# Patient Record
Sex: FEMALE | Race: BLACK OR AFRICAN AMERICAN | NOT HISPANIC OR LATINO | Employment: OTHER | URBAN - METROPOLITAN AREA
[De-identification: names, ages, dates, MRNs, and addresses within clinical notes are randomized per-mention and may not be internally consistent; named-entity substitution may affect disease eponyms.]

---

## 2017-03-10 ENCOUNTER — HOSPITAL ENCOUNTER (INPATIENT)
Facility: HOSPITAL | Age: 82
LOS: 9 days | Discharge: HOSPICE | DRG: 871 | End: 2017-03-20
Attending: EMERGENCY MEDICINE | Admitting: EMERGENCY MEDICINE
Payer: MEDICARE

## 2017-03-10 DIAGNOSIS — Z79.4 TYPE 2 DIABETES MELLITUS WITH DIABETIC NEPHROPATHY, WITH LONG-TERM CURRENT USE OF INSULIN: Chronic | ICD-10-CM

## 2017-03-10 DIAGNOSIS — I10 ESSENTIAL HYPERTENSION: Chronic | ICD-10-CM

## 2017-03-10 DIAGNOSIS — Z99.11 ON MECHANICALLY ASSISTED VENTILATION: ICD-10-CM

## 2017-03-10 DIAGNOSIS — J96.11 CHRONIC RESPIRATORY FAILURE WITH HYPOXIA: ICD-10-CM

## 2017-03-10 DIAGNOSIS — C76.0: ICD-10-CM

## 2017-03-10 DIAGNOSIS — J96.92 RESPIRATORY FAILURE WITH HYPOXIA AND HYPERCAPNIA, UNSPECIFIED CHRONICITY: Primary | ICD-10-CM

## 2017-03-10 DIAGNOSIS — J96.91 RESPIRATORY FAILURE WITH HYPOXIA AND HYPERCAPNIA, UNSPECIFIED CHRONICITY: Primary | ICD-10-CM

## 2017-03-10 DIAGNOSIS — D63.8 ANEMIA OF CHRONIC DISEASE: Chronic | ICD-10-CM

## 2017-03-10 DIAGNOSIS — E11.21 TYPE 2 DIABETES MELLITUS WITH DIABETIC NEPHROPATHY, WITH LONG-TERM CURRENT USE OF INSULIN: Chronic | ICD-10-CM

## 2017-03-10 DIAGNOSIS — N30.00 ACUTE CYSTITIS WITHOUT HEMATURIA: ICD-10-CM

## 2017-03-10 DIAGNOSIS — F03.90 DEMENTIA WITHOUT BEHAVIORAL DISTURBANCE, UNSPECIFIED DEMENTIA TYPE: ICD-10-CM

## 2017-03-10 DIAGNOSIS — I50.9 HEART FAILURE: ICD-10-CM

## 2017-03-10 LAB
ALBUMIN SERPL BCP-MCNC: 3.5 G/DL
ALLENS TEST: ABNORMAL
ALP SERPL-CCNC: 73 U/L
ALT SERPL W/O P-5'-P-CCNC: 15 U/L
ANION GAP SERPL CALC-SCNC: 13 MMOL/L
AST SERPL-CCNC: 28 U/L
BACTERIA #/AREA URNS HPF: ABNORMAL /HPF
BASOPHILS # BLD AUTO: 0.05 K/UL
BASOPHILS NFR BLD: 0.5 %
BILIRUB SERPL-MCNC: 0.4 MG/DL
BILIRUB UR QL STRIP: NEGATIVE
BNP SERPL-MCNC: 301 PG/ML
BUN SERPL-MCNC: 19 MG/DL
CALCIUM SERPL-MCNC: 9.4 MG/DL
CHLORIDE SERPL-SCNC: 106 MMOL/L
CLARITY UR: ABNORMAL
CO2 SERPL-SCNC: 20 MMOL/L
COLOR UR: YELLOW
CREAT SERPL-MCNC: 1.1 MG/DL
DELSYS: ABNORMAL
DIFFERENTIAL METHOD: ABNORMAL
EOSINOPHIL # BLD AUTO: 0.3 K/UL
EOSINOPHIL NFR BLD: 3.4 %
ERYTHROCYTE [DISTWIDTH] IN BLOOD BY AUTOMATED COUNT: 15.7 %
ERYTHROCYTE [SEDIMENTATION RATE] IN BLOOD BY WESTERGREN METHOD: 24 MM/H
EST. GFR  (AFRICAN AMERICAN): 55 ML/MIN/1.73 M^2
EST. GFR  (NON AFRICAN AMERICAN): 48 ML/MIN/1.73 M^2
FIO2: 60
GLUCOSE SERPL-MCNC: 438 MG/DL
GLUCOSE UR QL STRIP: ABNORMAL
HCO3 UR-SCNC: 20.3 MMOL/L (ref 24–28)
HCT VFR BLD AUTO: 32.5 %
HGB BLD-MCNC: 10.3 G/DL
HGB UR QL STRIP: NEGATIVE
HYALINE CASTS #/AREA URNS LPF: 0 /LPF
INR PPP: 1.1
KETONES UR QL STRIP: NEGATIVE
LACTATE SERPL-SCNC: 2.8 MMOL/L
LEUKOCYTE ESTERASE UR QL STRIP: NEGATIVE
LYMPHOCYTES # BLD AUTO: 4.4 K/UL
LYMPHOCYTES NFR BLD: 46.2 %
MAGNESIUM SERPL-MCNC: 2.2 MG/DL
MCH RBC QN AUTO: 25.7 PG
MCHC RBC AUTO-ENTMCNC: 31.7 %
MCV RBC AUTO: 81 FL
MICROSCOPIC COMMENT: ABNORMAL
MIN VOL: 10.1
MODE: ABNORMAL
MONOCYTES # BLD AUTO: 0.7 K/UL
MONOCYTES NFR BLD: 7.2 %
NEUTROPHILS # BLD AUTO: 4 K/UL
NEUTROPHILS NFR BLD: 42.3 %
NITRITE UR QL STRIP: NEGATIVE
PCO2 BLDA: 51.1 MMHG (ref 35–45)
PEEP: 5
PH SMN: 7.21 [PH] (ref 7.35–7.45)
PH UR STRIP: 6 [PH] (ref 5–8)
PHOSPHATE SERPL-MCNC: 6.8 MG/DL
PIP: 26
PLATELET # BLD AUTO: 291 K/UL
PMV BLD AUTO: 10.4 FL
PO2 BLDA: 123 MMHG (ref 80–100)
POC BE: -8 MMOL/L
POC SATURATED O2: 98 % (ref 95–100)
POC TCO2: 22 MMOL/L (ref 23–27)
POTASSIUM SERPL-SCNC: 3.8 MMOL/L
PROT SERPL-MCNC: 8.6 G/DL
PROT UR QL STRIP: ABNORMAL
PROTHROMBIN TIME: 11.1 SEC
RBC # BLD AUTO: 4.01 M/UL
RBC #/AREA URNS HPF: 3 /HPF (ref 0–4)
SAMPLE: ABNORMAL
SITE: ABNORMAL
SODIUM SERPL-SCNC: 139 MMOL/L
SP GR UR STRIP: 1.01 (ref 1–1.03)
SP02: 100
TROPONIN I SERPL DL<=0.01 NG/ML-MCNC: 0.03 NG/ML
URN SPEC COLLECT METH UR: ABNORMAL
UROBILINOGEN UR STRIP-ACNC: NEGATIVE EU/DL
VT: 400
WBC # BLD AUTO: 9.53 K/UL
WBC #/AREA URNS HPF: 4 /HPF (ref 0–5)
YEAST URNS QL MICRO: ABNORMAL

## 2017-03-10 PROCEDURE — 84100 ASSAY OF PHOSPHORUS: CPT

## 2017-03-10 PROCEDURE — 85610 PROTHROMBIN TIME: CPT

## 2017-03-10 PROCEDURE — 99291 CRITICAL CARE FIRST HOUR: CPT | Mod: 25

## 2017-03-10 PROCEDURE — 82570 ASSAY OF URINE CREATININE: CPT

## 2017-03-10 PROCEDURE — 96365 THER/PROPH/DIAG IV INF INIT: CPT

## 2017-03-10 PROCEDURE — 27200966 HC CLOSED SUCTION SYSTEM

## 2017-03-10 PROCEDURE — 83605 ASSAY OF LACTIC ACID: CPT

## 2017-03-10 PROCEDURE — 87186 SC STD MICRODIL/AGAR DIL: CPT

## 2017-03-10 PROCEDURE — 80329 ANALGESICS NON-OPIOID 1 OR 2: CPT

## 2017-03-10 PROCEDURE — 80053 COMPREHEN METABOLIC PANEL: CPT

## 2017-03-10 PROCEDURE — 25000003 PHARM REV CODE 250: Performed by: EMERGENCY MEDICINE

## 2017-03-10 PROCEDURE — 31500 INSERT EMERGENCY AIRWAY: CPT

## 2017-03-10 PROCEDURE — 83735 ASSAY OF MAGNESIUM: CPT

## 2017-03-10 PROCEDURE — 87088 URINE BACTERIA CULTURE: CPT

## 2017-03-10 PROCEDURE — 96375 TX/PRO/DX INJ NEW DRUG ADDON: CPT

## 2017-03-10 PROCEDURE — 85025 COMPLETE CBC W/AUTO DIFF WBC: CPT

## 2017-03-10 PROCEDURE — 36600 WITHDRAWAL OF ARTERIAL BLOOD: CPT

## 2017-03-10 PROCEDURE — 83930 ASSAY OF BLOOD OSMOLALITY: CPT

## 2017-03-10 PROCEDURE — 89220 SPUTUM SPECIMEN COLLECTION: CPT

## 2017-03-10 PROCEDURE — 80307 DRUG TEST PRSMV CHEM ANLYZR: CPT

## 2017-03-10 PROCEDURE — 87077 CULTURE AEROBIC IDENTIFY: CPT

## 2017-03-10 PROCEDURE — 96361 HYDRATE IV INFUSION ADD-ON: CPT

## 2017-03-10 PROCEDURE — 0BH17EZ INSERTION OF ENDOTRACHEAL AIRWAY INTO TRACHEA, VIA NATURAL OR ARTIFICIAL OPENING: ICD-10-PCS | Performed by: EMERGENCY MEDICINE

## 2017-03-10 PROCEDURE — 80320 DRUG SCREEN QUANTALCOHOLS: CPT

## 2017-03-10 PROCEDURE — 63600175 PHARM REV CODE 636 W HCPCS: Performed by: EMERGENCY MEDICINE

## 2017-03-10 PROCEDURE — 51702 INSERT TEMP BLADDER CATH: CPT

## 2017-03-10 PROCEDURE — 83880 ASSAY OF NATRIURETIC PEPTIDE: CPT

## 2017-03-10 PROCEDURE — 87086 URINE CULTURE/COLONY COUNT: CPT

## 2017-03-10 PROCEDURE — 87040 BLOOD CULTURE FOR BACTERIA: CPT

## 2017-03-10 PROCEDURE — 5A1945Z RESPIRATORY VENTILATION, 24-96 CONSECUTIVE HOURS: ICD-10-PCS | Performed by: EMERGENCY MEDICINE

## 2017-03-10 PROCEDURE — 84484 ASSAY OF TROPONIN QUANT: CPT

## 2017-03-10 PROCEDURE — 81000 URINALYSIS NONAUTO W/SCOPE: CPT

## 2017-03-10 RX ORDER — ETOMIDATE 2 MG/ML
20 INJECTION INTRAVENOUS
Status: COMPLETED | OUTPATIENT
Start: 2017-03-10 | End: 2017-03-10

## 2017-03-10 RX ORDER — FENTANYL CITRATE 50 UG/ML
50 INJECTION, SOLUTION INTRAMUSCULAR; INTRAVENOUS
Status: COMPLETED | OUTPATIENT
Start: 2017-03-10 | End: 2017-03-10

## 2017-03-10 RX ORDER — PROPOFOL 10 MG/ML
20 INJECTION, EMULSION INTRAVENOUS
Status: COMPLETED | OUTPATIENT
Start: 2017-03-10 | End: 2017-03-10

## 2017-03-10 RX ORDER — ROCURONIUM BROMIDE 10 MG/ML
100 INJECTION, SOLUTION INTRAVENOUS
Status: COMPLETED | OUTPATIENT
Start: 2017-03-10 | End: 2017-03-10

## 2017-03-10 RX ADMIN — PROPOFOL 20 MCG/KG/MIN: 10 INJECTION, EMULSION INTRAVENOUS at 11:03

## 2017-03-10 RX ADMIN — SODIUM CHLORIDE 1000 ML: 0.9 INJECTION, SOLUTION INTRAVENOUS at 10:03

## 2017-03-10 RX ADMIN — FENTANYL CITRATE 50 MCG: 50 INJECTION, SOLUTION INTRAMUSCULAR; INTRAVENOUS at 10:03

## 2017-03-10 RX ADMIN — ROCURONIUM BROMIDE 100 MG: 10 INJECTION, SOLUTION INTRAVENOUS at 10:03

## 2017-03-10 RX ADMIN — ETOMIDATE 20 MG: 2 INJECTION, SOLUTION INTRAVENOUS at 10:03

## 2017-03-10 NOTE — IP AVS SNAPSHOT
[address] [address] [address] [address]  Phone: [phone]           Patient Discharge Instructions     Our goal is to set you up for success. This packet includes information on your condition, medications, and your home care. It will help you to care for yourself so you don't get sicker and need to go back to the hospital.     Please ask your nurse if you have any questions.        There are many details to remember when preparing to leave the hospital. Here is what you will need to do:    1. Take your medicine. If you are prescribed medications, review your Medication List in the following pages. You may have new medications to  at the pharmacy and others that you'll need to stop taking. Review the instructions for how and when to take your medications. Talk with your doctor or nurses if you are unsure of what to do.     2. Go to your follow-up appointments. Specific follow-up information is listed in the following pages. Your may be contacted by a transition nurse or clinical provider about future appointments. Be sure we have all of the phone numbers to reach you, if needed. Please contact your provider's office if you are unable to make an appointment.     3. Watch for warning signs. Your doctor or nurse will give you detailed warning signs to watch for and when to call for assistance. These instructions may also include educational information about your condition. If you experience any of warning signs to your health, call your doctor.               [facility]  Unless otherwise directed by your provider, please contact [facility], our nurse care line that is available for 24/7 assistance.     [phone] (toll-free)    Registered nurses in the [facility] provide clinical advisement, health education, appointment booking, and other advisory services.                    ** Verify the list of medication(s) below is accurate and up to date.  Carry this with you in case of emergency. If your medications have changed, please notify your healthcare provider.             Medication List      START taking these medications        Additional Info                      amoxicillin-clavulanate 875-125mg 875-125 mg per tablet   Commonly known as:  AUGMENTIN   Quantity:  14 tablet   Refills:  0   Dose:  1 tablet   Indications:  Urinary Tract Infection    Last time this was given:  1 tablet on 3/20/2017 10:00 AM   Instructions:  Take 1 tablet by mouth every 12 (twelve) hours.     Begin Date    AM    Noon    PM    Bedtime         CHANGE how you take these medications        Additional Info                      lisinopril 10 MG tablet   Quantity:  30 tablet   Refills:  3   Dose:  10 mg   What changed:    - medication strength  - how much to take    Last time this was given:  10 mg on 3/18/2017  9:06 AM   Instructions:  Take 1 tablet (10 mg total) by mouth once daily.     Begin Date    AM    Noon    PM    Bedtime         CONTINUE taking these medications        Additional Info                      aspirin 81 MG EC tablet   Commonly known as:  ECOTRIN   Refills:  0   Dose:  81 mg    Last time this was given:  81 mg on 3/20/2017 10:00 AM   Instructions:  Take 81 mg by mouth once daily.     Begin Date    AM    Noon    PM    Bedtime       donepezil 10 MG tablet   Commonly known as:  ARICEPT   Refills:  0   Dose:  10 mg    Last time this was given:  10 mg on 3/19/2017  8:19 PM   Instructions:  Take 10 mg by mouth every evening.     Begin Date    AM    Noon    PM    Bedtime       metformin 500 MG tablet   Commonly known as:  GLUCOPHAGE   Refills:  0   Dose:  500 mg    Instructions:  Take 500 mg by mouth 2 (two) times daily with meals.     Begin Date    AM    Noon    PM    Bedtime       mirtazapine 15 MG tablet   Commonly known as:  REMERON   Refills:  0   Dose:  15 mg    Instructions:  Take 15 mg by mouth every evening.     Begin Date    AM    Noon    PM    Bedtime           "  Where to Get Your Medications      You can get these medications from any pharmacy     Bring a paper prescription for each of these medications     amoxicillin-clavulanate 875-125mg 875-125 mg per tablet    lisinopril 10 MG tablet                  Please bring to all follow up appointments:    1. A copy of your discharge instructions.  2. All medicines you are currently taking in their original bottles.  3. Identification and insurance card.    Please arrive 15 minutes ahead of scheduled appointment time.    Please call 24 hours in advance if you must reschedule your appointment and/or time.        Follow-up Information     Follow up with Bandar Hernández MD.    Specialty:  Geriatric Medicine    Contact information:    [address] [address]  [phone]          Discharge Instructions     Future Orders    Activity as tolerated     Diet general     Questions:    Total calories:      Fat restriction, if any:      Protein restriction, if any:      Na restriction, if any:      Fluid restriction:      Additional restrictions:  Pureed        Primary Diagnosis     Your primary diagnosis was:  Chronic Respiratory Failure      Admission Information     Date & Time Provider Department [facility]    3/10/2017 10:22 PM Josefa Martínez MD [facility] [id number]      Care Providers     Provider Role Specialty Primary office phone    Josefa Martínez MD Attending Provider Hospitalist [phone]    Swati Us, TRISHA Consulting Physician  -- Number not on file      Important Medicare Message          Most Recent Value    Important Message from Medicare Regarding Discharge Appeal Rights  Given to patient/caregiver, Explained to patient/caregiver, Signed/date by patient/caregiver yes 03/20/2017 1647      Your Vitals Were     BP Pulse Temp Resp Height Weight    118/58 (BP Location: Left arm, Patient Position: Lying, BP Method: Automatic) 69 97.9 °F (36.6 °C) (Oral) 19 5' 2" (1.575 m) 51.7 kg (113 lb 15.7 " oz)    SpO2 BMI             98% 20.85 kg/m2         Recent Lab Values     No lab values to display.      Allergies as of 3/20/2017     No Known Allergies      Advance Directives     An advance directive is a document which, in the event you are no longer able to make decisions for yourself, tells your healthcare team what kind of treatment you do or do not want to receive, or who you would like to make those decisions for you.  If you do not currently have an advance directive, [facility] encourages you to create one.  For more information call:  [phone]), [phone] ([phone]),  or log on to [email or web address].        Language Assistance Services     ATTENTION: Language assistance services are available, free of charge. Please call [phone].      ATENCIÓN: Si habla español, tiene a kaur disposición servicios gratuitos de asistencia lingüística. Llame al [phone].     [facility] Ý: N?u b?n nói Ti?ng Vi?t, có các d?ch v? h? tr? ngôn ng? mi?n phí dành cho b?n. G?i s? [phone].        Pneumonmia Discharge Instructions                Diabetes Discharge Instructions                                   [facility] Sign-Up     Activating your [facility] account is as easy as 1-2-3!     1) Visit [email or web address], select Sign Up Now, enter this activation code and your date of birth, then select Next.  [email or web address]  Expires: 5/4/2017  4:50 PM      2) Create a username and password to use when you visit [facility] in the future and select a security question in case you lose your password and select Next.    3) Enter your e-mail address and click Sign Up!    Additional Information  If you have questions, please e-mail [email or web address] or call [phone] to talk to our [facility] staff. Remember, [facility] is NOT to be used for urgent needs. For medical emergencies, dial 911.          [facility] complies with applicable Federal civil rights laws and does not  discriminate on the basis of race, color, national origin, age, disability, or sex.

## 2017-03-11 PROBLEM — N39.0 UTI (URINARY TRACT INFECTION): Status: ACTIVE | Noted: 2017-03-11

## 2017-03-11 PROBLEM — Z99.11 ON MECHANICALLY ASSISTED VENTILATION: Status: ACTIVE | Noted: 2017-03-11

## 2017-03-11 PROBLEM — E11.9 TYPE 2 DIABETES MELLITUS: Chronic | Status: ACTIVE | Noted: 2017-03-11

## 2017-03-11 PROBLEM — J96.92 RESPIRATORY FAILURE WITH HYPOXIA AND HYPERCAPNIA: Status: ACTIVE | Noted: 2017-03-11

## 2017-03-11 PROBLEM — I10 ESSENTIAL HYPERTENSION: Chronic | Status: ACTIVE | Noted: 2017-03-11

## 2017-03-11 PROBLEM — F03.90 DEMENTIA: Chronic | Status: ACTIVE | Noted: 2017-03-11

## 2017-03-11 PROBLEM — J96.01 ACUTE RESPIRATORY FAILURE WITH HYPOXIA: Status: ACTIVE | Noted: 2017-03-11

## 2017-03-11 PROBLEM — D63.8 ANEMIA OF CHRONIC DISEASE: Chronic | Status: ACTIVE | Noted: 2017-03-11

## 2017-03-11 PROBLEM — J96.91 RESPIRATORY FAILURE WITH HYPOXIA AND HYPERCAPNIA: Status: ACTIVE | Noted: 2017-03-11

## 2017-03-11 LAB
ALBUMIN SERPL BCP-MCNC: 3.3 G/DL
ALLENS TEST: ABNORMAL
ALLENS TEST: ABNORMAL
ALP SERPL-CCNC: 59 U/L
ALT SERPL W/O P-5'-P-CCNC: 14 U/L
AMPHET+METHAMPHET UR QL: NEGATIVE
ANION GAP SERPL CALC-SCNC: 15 MMOL/L
APAP SERPL-MCNC: <3 UG/ML
AST SERPL-CCNC: 26 U/L
BARBITURATES UR QL SCN>200 NG/ML: NEGATIVE
BASOPHILS # BLD AUTO: 0.01 K/UL
BASOPHILS NFR BLD: 0.1 %
BENZODIAZ UR QL SCN>200 NG/ML: NEGATIVE
BILIRUB SERPL-MCNC: 0.6 MG/DL
BUN SERPL-MCNC: 17 MG/DL
BZE UR QL SCN: NEGATIVE
CALCIUM SERPL-MCNC: 9.2 MG/DL
CANNABINOIDS UR QL SCN: NEGATIVE
CHLORIDE SERPL-SCNC: 106 MMOL/L
CO2 SERPL-SCNC: 17 MMOL/L
CREAT SERPL-MCNC: 0.9 MG/DL
CREAT UR-MCNC: 37.9 MG/DL
CTP QC/QA: YES
DELSYS: ABNORMAL
DELSYS: ABNORMAL
DIFFERENTIAL METHOD: ABNORMAL
EOSINOPHIL # BLD AUTO: 0 K/UL
EOSINOPHIL NFR BLD: 0.3 %
ERYTHROCYTE [DISTWIDTH] IN BLOOD BY AUTOMATED COUNT: 16 %
ERYTHROCYTE [SEDIMENTATION RATE] IN BLOOD BY WESTERGREN METHOD: 16 MM/H
ERYTHROCYTE [SEDIMENTATION RATE] IN BLOOD BY WESTERGREN METHOD: 24 MM/H
EST. GFR  (AFRICAN AMERICAN): >60 ML/MIN/1.73 M^2
EST. GFR  (NON AFRICAN AMERICAN): >60 ML/MIN/1.73 M^2
ETHANOL SERPL-MCNC: <10 MG/DL
FECAL OCCULT BLOOD, POC: POSITIVE
FIO2: 60
FIO2: 60
GLUCOSE SERPL-MCNC: 315 MG/DL
HCO3 UR-SCNC: 20.4 MMOL/L (ref 24–28)
HCO3 UR-SCNC: 21.8 MMOL/L (ref 24–28)
HCT VFR BLD AUTO: 32.2 %
HGB BLD-MCNC: 10.1 G/DL
LYMPHOCYTES # BLD AUTO: 0.7 K/UL
LYMPHOCYTES NFR BLD: 7 %
MCH RBC QN AUTO: 25.4 PG
MCHC RBC AUTO-ENTMCNC: 31.4 %
MCV RBC AUTO: 81 FL
METHADONE UR QL SCN>300 NG/ML: NEGATIVE
MIN VOL: 10
MIN VOL: 6.9
MODE: ABNORMAL
MODE: ABNORMAL
MONOCYTES # BLD AUTO: 1 K/UL
MONOCYTES NFR BLD: 10.4 %
NEUTROPHILS # BLD AUTO: 7.6 K/UL
NEUTROPHILS NFR BLD: 82.1 %
OPIATES UR QL SCN: NEGATIVE
OSMOLALITY SERPL: 316 MOSM/KG
PCO2 BLDA: 35 MMHG (ref 35–45)
PCO2 BLDA: 37 MMHG (ref 35–45)
PCP UR QL SCN>25 NG/ML: NEGATIVE
PEEP: 5
PEEP: 5
PH SMN: 7.37 [PH] (ref 7.35–7.45)
PH SMN: 7.38 [PH] (ref 7.35–7.45)
PIP: 21
PIP: 24
PLATELET # BLD AUTO: 248 K/UL
PMV BLD AUTO: 9.7 FL
PO2 BLDA: 158 MMHG (ref 80–100)
PO2 BLDA: 203 MMHG (ref 80–100)
POC BE: -3 MMOL/L
POC BE: -4 MMOL/L
POC SATURATED O2: 100 % (ref 95–100)
POC SATURATED O2: 99 % (ref 95–100)
POC TCO2: 21 MMOL/L (ref 23–27)
POC TCO2: 23 MMOL/L (ref 23–27)
POCT GLUCOSE: 158 MG/DL (ref 70–110)
POCT GLUCOSE: 267 MG/DL (ref 70–110)
POCT GLUCOSE: 389 MG/DL (ref 70–110)
POTASSIUM SERPL-SCNC: 3.8 MMOL/L
PROT SERPL-MCNC: 7.9 G/DL
RBC # BLD AUTO: 3.97 M/UL
SALICYLATES SERPL-MCNC: <5 MG/DL
SAMPLE: ABNORMAL
SAMPLE: ABNORMAL
SITE: ABNORMAL
SITE: ABNORMAL
SODIUM SERPL-SCNC: 138 MMOL/L
SP02: 100
SP02: 99
TOXICOLOGY INFORMATION: NORMAL
TROPONIN I SERPL DL<=0.01 NG/ML-MCNC: 0.15 NG/ML
TROPONIN I SERPL DL<=0.01 NG/ML-MCNC: 0.17 NG/ML
VT: 400
VT: 400
WBC # BLD AUTO: 9.27 K/UL

## 2017-03-11 PROCEDURE — 94761 N-INVAS EAR/PLS OXIMETRY MLT: CPT

## 2017-03-11 PROCEDURE — 87070 CULTURE OTHR SPECIMN AEROBIC: CPT

## 2017-03-11 PROCEDURE — 87205 SMEAR GRAM STAIN: CPT

## 2017-03-11 PROCEDURE — 80053 COMPREHEN METABOLIC PANEL: CPT

## 2017-03-11 PROCEDURE — 27200966 HC CLOSED SUCTION SYSTEM

## 2017-03-11 PROCEDURE — 25000003 PHARM REV CODE 250: Performed by: INTERNAL MEDICINE

## 2017-03-11 PROCEDURE — 94003 VENT MGMT INPAT SUBQ DAY: CPT

## 2017-03-11 PROCEDURE — 63600175 PHARM REV CODE 636 W HCPCS: Performed by: EMERGENCY MEDICINE

## 2017-03-11 PROCEDURE — 93306 TTE W/DOPPLER COMPLETE: CPT

## 2017-03-11 PROCEDURE — C9113 INJ PANTOPRAZOLE SODIUM, VIA: HCPCS | Performed by: EMERGENCY MEDICINE

## 2017-03-11 PROCEDURE — 87186 SC STD MICRODIL/AGAR DIL: CPT

## 2017-03-11 PROCEDURE — 63600175 PHARM REV CODE 636 W HCPCS: Performed by: INTERNAL MEDICINE

## 2017-03-11 PROCEDURE — 99900026 HC AIRWAY MAINTENANCE (STAT)

## 2017-03-11 PROCEDURE — 36415 COLL VENOUS BLD VENIPUNCTURE: CPT

## 2017-03-11 PROCEDURE — 93306 TTE W/DOPPLER COMPLETE: CPT | Mod: 26,,, | Performed by: INTERNAL MEDICINE

## 2017-03-11 PROCEDURE — 36600 WITHDRAWAL OF ARTERIAL BLOOD: CPT

## 2017-03-11 PROCEDURE — 87077 CULTURE AEROBIC IDENTIFY: CPT

## 2017-03-11 PROCEDURE — 25000003 PHARM REV CODE 250: Performed by: EMERGENCY MEDICINE

## 2017-03-11 PROCEDURE — 31720 CLEARANCE OF AIRWAYS: CPT

## 2017-03-11 PROCEDURE — 84484 ASSAY OF TROPONIN QUANT: CPT | Mod: 91

## 2017-03-11 PROCEDURE — 85025 COMPLETE CBC W/AUTO DIFF WBC: CPT

## 2017-03-11 PROCEDURE — 20000000 HC ICU ROOM

## 2017-03-11 RX ORDER — DONEPEZIL HYDROCHLORIDE 10 MG/1
10 TABLET, FILM COATED ORAL NIGHTLY
Status: DISCONTINUED | OUTPATIENT
Start: 2017-03-11 | End: 2017-03-20 | Stop reason: HOSPADM

## 2017-03-11 RX ORDER — CIPROFLOXACIN 2 MG/ML
400 INJECTION, SOLUTION INTRAVENOUS
Status: DISCONTINUED | OUTPATIENT
Start: 2017-03-11 | End: 2017-03-12

## 2017-03-11 RX ORDER — SODIUM CHLORIDE 9 MG/ML
INJECTION, SOLUTION INTRAVENOUS CONTINUOUS
Status: ACTIVE | OUTPATIENT
Start: 2017-03-11 | End: 2017-03-12

## 2017-03-11 RX ORDER — ONDANSETRON 2 MG/ML
4 INJECTION INTRAMUSCULAR; INTRAVENOUS EVERY 8 HOURS PRN
Status: DISCONTINUED | OUTPATIENT
Start: 2017-03-11 | End: 2017-03-11

## 2017-03-11 RX ORDER — CHLORHEXIDINE GLUCONATE ORAL RINSE 1.2 MG/ML
15 SOLUTION DENTAL 2 TIMES DAILY
Status: DISCONTINUED | OUTPATIENT
Start: 2017-03-11 | End: 2017-03-20 | Stop reason: HOSPADM

## 2017-03-11 RX ORDER — CIPROFLOXACIN 2 MG/ML
400 INJECTION, SOLUTION INTRAVENOUS
Status: DISCONTINUED | OUTPATIENT
Start: 2017-03-11 | End: 2017-03-11

## 2017-03-11 RX ORDER — SODIUM CHLORIDE 0.9 % (FLUSH) 0.9 %
3 SYRINGE (ML) INJECTION EVERY 8 HOURS
Status: DISCONTINUED | OUTPATIENT
Start: 2017-03-11 | End: 2017-03-11

## 2017-03-11 RX ORDER — INSULIN ASPART 100 [IU]/ML
0-5 INJECTION, SOLUTION INTRAVENOUS; SUBCUTANEOUS
Status: DISCONTINUED | OUTPATIENT
Start: 2017-03-11 | End: 2017-03-20 | Stop reason: HOSPADM

## 2017-03-11 RX ORDER — RAMELTEON 8 MG/1
8 TABLET ORAL NIGHTLY PRN
Status: DISCONTINUED | OUTPATIENT
Start: 2017-03-11 | End: 2017-03-20 | Stop reason: HOSPADM

## 2017-03-11 RX ORDER — SODIUM CHLORIDE 9 MG/ML
INJECTION, SOLUTION INTRAVENOUS CONTINUOUS
Status: DISCONTINUED | OUTPATIENT
Start: 2017-03-11 | End: 2017-03-11

## 2017-03-11 RX ORDER — ASPIRIN 81 MG/1
81 TABLET ORAL DAILY
Status: DISCONTINUED | OUTPATIENT
Start: 2017-03-11 | End: 2017-03-20 | Stop reason: HOSPADM

## 2017-03-11 RX ORDER — PROPOFOL 10 MG/ML
5 INJECTION, EMULSION INTRAVENOUS CONTINUOUS
Status: DISCONTINUED | OUTPATIENT
Start: 2017-03-11 | End: 2017-03-15

## 2017-03-11 RX ORDER — ACETAMINOPHEN 500 MG
500 TABLET ORAL EVERY 6 HOURS PRN
Status: DISCONTINUED | OUTPATIENT
Start: 2017-03-11 | End: 2017-03-20 | Stop reason: HOSPADM

## 2017-03-11 RX ORDER — PANTOPRAZOLE SODIUM 40 MG/10ML
80 INJECTION, POWDER, LYOPHILIZED, FOR SOLUTION INTRAVENOUS
Status: DISCONTINUED | OUTPATIENT
Start: 2017-03-11 | End: 2017-03-11

## 2017-03-11 RX ORDER — PANTOPRAZOLE SODIUM 40 MG/10ML
80 INJECTION, POWDER, LYOPHILIZED, FOR SOLUTION INTRAVENOUS ONCE
Status: COMPLETED | OUTPATIENT
Start: 2017-03-11 | End: 2017-03-11

## 2017-03-11 RX ORDER — ENOXAPARIN SODIUM 100 MG/ML
40 INJECTION SUBCUTANEOUS EVERY 24 HOURS
Status: DISCONTINUED | OUTPATIENT
Start: 2017-03-11 | End: 2017-03-19

## 2017-03-11 RX ORDER — IBUPROFEN 200 MG
16 TABLET ORAL
Status: DISCONTINUED | OUTPATIENT
Start: 2017-03-11 | End: 2017-03-20 | Stop reason: HOSPADM

## 2017-03-11 RX ORDER — LISINOPRIL 20 MG/1
20 TABLET ORAL DAILY
Status: DISCONTINUED | OUTPATIENT
Start: 2017-03-11 | End: 2017-03-11

## 2017-03-11 RX ORDER — HYDRALAZINE HYDROCHLORIDE 20 MG/ML
10 INJECTION INTRAMUSCULAR; INTRAVENOUS EVERY 6 HOURS PRN
Status: DISCONTINUED | OUTPATIENT
Start: 2017-03-11 | End: 2017-03-20 | Stop reason: HOSPADM

## 2017-03-11 RX ORDER — IBUPROFEN 200 MG
24 TABLET ORAL
Status: DISCONTINUED | OUTPATIENT
Start: 2017-03-11 | End: 2017-03-20 | Stop reason: HOSPADM

## 2017-03-11 RX ORDER — GLUCAGON 1 MG
1 KIT INJECTION
Status: DISCONTINUED | OUTPATIENT
Start: 2017-03-11 | End: 2017-03-20 | Stop reason: HOSPADM

## 2017-03-11 RX ORDER — ONDANSETRON 2 MG/ML
8 INJECTION INTRAMUSCULAR; INTRAVENOUS EVERY 8 HOURS PRN
Status: DISCONTINUED | OUTPATIENT
Start: 2017-03-11 | End: 2017-03-20 | Stop reason: HOSPADM

## 2017-03-11 RX ADMIN — ENOXAPARIN SODIUM 40 MG: 100 INJECTION SUBCUTANEOUS at 12:03

## 2017-03-11 RX ADMIN — ASPIRIN 81 MG: 81 TABLET, COATED ORAL at 10:03

## 2017-03-11 RX ADMIN — CHLORHEXIDINE GLUCONATE 15 ML: 1.2 RINSE ORAL at 08:03

## 2017-03-11 RX ADMIN — PIPERACILLIN SODIUM AND TAZOBACTAM SODIUM 4.5 G: 4; .5 INJECTION, POWDER, LYOPHILIZED, FOR SOLUTION INTRAVENOUS at 01:03

## 2017-03-11 RX ADMIN — CHLORHEXIDINE GLUCONATE 15 ML: 1.2 RINSE ORAL at 09:03

## 2017-03-11 RX ADMIN — SODIUM CHLORIDE: 0.9 INJECTION, SOLUTION INTRAVENOUS at 09:03

## 2017-03-11 RX ADMIN — DEXTROSE 8 MG/HR: 50 INJECTION, SOLUTION INTRAVENOUS at 02:03

## 2017-03-11 RX ADMIN — SODIUM CHLORIDE: 0.9 INJECTION, SOLUTION INTRAVENOUS at 02:03

## 2017-03-11 RX ADMIN — FENTANYL CITRATE 25 MCG/HR: 50 INJECTION, SOLUTION INTRAMUSCULAR; INTRAVENOUS at 03:03

## 2017-03-11 RX ADMIN — INSULIN ASPART 3 UNITS: 100 INJECTION, SOLUTION INTRAVENOUS; SUBCUTANEOUS at 12:03

## 2017-03-11 RX ADMIN — PANTOPRAZOLE SODIUM 80 MG: 40 INJECTION, POWDER, FOR SOLUTION INTRAVENOUS at 02:03

## 2017-03-11 RX ADMIN — PROPOFOL 10 MCG/KG/MIN: 10 INJECTION, EMULSION INTRAVENOUS at 05:03

## 2017-03-11 RX ADMIN — DEXTROSE 8 MG/HR: 50 INJECTION, SOLUTION INTRAVENOUS at 11:03

## 2017-03-11 RX ADMIN — CIPROFLOXACIN 400 MG: 2 INJECTION, SOLUTION INTRAVENOUS at 03:03

## 2017-03-11 RX ADMIN — PIPERACILLIN AND TAZOBACTAM 4.5 G: 4; .5 INJECTION, POWDER, FOR SOLUTION INTRAVENOUS at 09:03

## 2017-03-11 RX ADMIN — DEXTROSE 8 MG/HR: 50 INJECTION, SOLUTION INTRAVENOUS at 05:03

## 2017-03-11 RX ADMIN — SODIUM CHLORIDE: 0.9 INJECTION, SOLUTION INTRAVENOUS at 11:03

## 2017-03-11 RX ADMIN — SODIUM CHLORIDE: 0.9 INJECTION, SOLUTION INTRAVENOUS at 03:03

## 2017-03-11 RX ADMIN — VANCOMYCIN HYDROCHLORIDE 750 MG: 750 INJECTION, POWDER, LYOPHILIZED, FOR SOLUTION INTRAVENOUS at 02:03

## 2017-03-11 RX ADMIN — PROPOFOL 50 MCG/KG/MIN: 10 INJECTION, EMULSION INTRAVENOUS at 02:03

## 2017-03-11 RX ADMIN — PIPERACILLIN AND TAZOBACTAM 4.5 G: 4; .5 INJECTION, POWDER, FOR SOLUTION INTRAVENOUS at 06:03

## 2017-03-11 RX ADMIN — DEXTROSE 8 MG/HR: 50 INJECTION, SOLUTION INTRAVENOUS at 09:03

## 2017-03-11 RX ADMIN — INSULIN ASPART 5 UNITS: 100 INJECTION, SOLUTION INTRAVENOUS; SUBCUTANEOUS at 05:03

## 2017-03-11 RX ADMIN — DONEPEZIL HYDROCHLORIDE 10 MG: 10 TABLET, FILM COATED ORAL at 09:03

## 2017-03-11 RX ADMIN — DEXTROSE 8 MG/HR: 50 INJECTION, SOLUTION INTRAVENOUS at 04:03

## 2017-03-11 NOTE — ASSESSMENT & PLAN NOTE
The etiology of her respiratory failure is unclear at this time.  I have reviewed the chest X-ray and it reveals no focal infiltrates or pleural effusions.  She was intubated for airway protection and for hypoxia as she was with agonal respirations.  A post-intubation ABG was obtained to and was revealing for 7.207  51.1  123  20.3 on a ventilator setting of AC 14  400  +5  60%.  Will consult Pulmonary ventilator management.

## 2017-03-11 NOTE — ED NOTES
Patient transferred to ICU ROOM 271 via 100% ambu bag and placed back on ventilator upon arrival. All alarms on, set and functioning. Will continue to monitor.

## 2017-03-11 NOTE — PLAN OF CARE
Problem: Patient Care Overview  Goal: Plan of Care Review  Outcome: Ongoing (interventions implemented as appropriate)  Pt admitted to ICU last night for respiratory failure with hypoxia. Pt arrived to ICU intubated, copious bloody secretions around mouth and in ETT and filter. OGT to LIWsuction with dark brown/bloody output, ~150 ml. Protonix gtt started. Propofol and fentanyl for sedation. [place] with adequate output. IVF's @ 100 ml/hr. Wakes up and tracks but does not follow commands, question of whether pt speaks english or not. Remains free from fall, injury, or breakdown throughout shift.

## 2017-03-11 NOTE — ED PROVIDER NOTES
Encounter Date: 3/10/2017    SCRIBE #1 NOTE: I, Xavier Brannon, am scribing for, and in the presence of,  Karlos Pandey MD. I have scribed the following portions of the note - Other sections scribed: HPI, ROS.       History     Chief Complaint   Patient presents with    Shortness of Breath     FROM [facility], PER EMS PT UNRESPONSIVE, SHALLOW BREATHING, 72% RA. ALL OTHER VSS.     Review of patient's allergies indicates:  No Known Allergies  HPI Comments: CC: Unresponsiveness    HPI: This 82 year old female has a past medical history of dementia, diabetes mellitus, and hypertension presents to the ED via EMS for evaluation of unresponsiveness. Per EMS, pt was picked up from [facility]. EMS reports pt was found unresponsive at 9:15 pm today. EMS also reports pt had shallow breathing. History limited due to acuity of symptoms.           The history is provided by the EMS personnel. No  was used.     Past Medical History:   Diagnosis Date    Cancer     Dementia     Diabetes mellitus     Hypertension     Thyroid disease      History reviewed. No pertinent surgical history.  History reviewed. No pertinent family history.  Social History   Substance Use Topics    Smoking status: Never Smoker    Smokeless tobacco: None    Alcohol use No     Review of Systems   Unable to perform ROS: Acuity of condition       Physical Exam   Initial Vitals   BP Pulse Resp Temp SpO2   -- -- -- -- --            Physical Exam    Nursing note and vitals reviewed.  HENT:   Head: Normocephalic and atraumatic.   Eyes: Conjunctivae are normal.   Neck: Normal range of motion. Neck supple. No JVD present.   Cardiovascular:   Tachycardic, regular rhythm   Pulmonary/Chest: No stridor.   Agonal - BS diminished bilaterally.   Abdominal: Soft.   Musculoskeletal: Normal range of motion.   Neurological:   GCS 3         ED Course   Critical Care  Date/Time: 3/11/2017 12:22 AM  Performed by: AMANDA  RICH SCHWARZ  Authorized by: RICH PATEL   Direct patient critical care time: 20 minutes  Additional history critical care time: 10 minutes  Ordering / reviewing critical care time: 10 minutes  Documentation critical care time: 10 minutes  Consulting other physicians critical care time: 5 minutes  Total critical care time (exclusive of procedural time) : 55 minutes  Critical care time was exclusive of teaching time and separately billable procedures and treating other patients.  Critical care was necessary to treat or prevent imminent or life-threatening deterioration of the following conditions: respiratory failure and CNS failure or compromise.  Critical care was time spent personally by me on the following activities: development of treatment plan with patient or surrogate, evaluation of patient's response to treatment, examination of patient, obtaining history from patient or surrogate, ordering and performing treatments and interventions, ordering and review of laboratory studies, ordering and review of radiographic studies, pulse oximetry, re-evaluation of patient's condition and review of old charts.    Intubation  Date/Time: 3/11/2017 1:23 AM  Performed by: RICH PATEL  Authorized by: RICH PATEL   Consent Done: Emergent Situation  Indications: respiratory failure,  airway protection,  hypoxemia and  hypercapnia  Intubation method: direct  Patient status: paralyzed (RSI)  Preoxygenation: BVM  Sedatives: etomidate  Paralytic: rocuronium  Laryngoscope size: Mac 4  Tube size: 7.5 mm  Tube type: cuffed  Number of attempts: 1  Cricoid pressure: yes  Cords visualized: yes  Post-procedure assessment: CO2 detector and chest rise  Breath sounds: diminished bilaterally  Cuff inflated: yes  ETT to lip: 24 cm  Tube secured with: ETT hugo  Chest x-ray interpreted by me and radiologist.  Chest x-ray findings: endotracheal tube in appropriate position  Patient tolerance: Patient tolerated the procedure  well with no immediate complications  Complications: No  Comments: Although the intubation was atraumatic, there was a scant amount of blood visualized just above the cords w/o an obviously identifiable source.        Labs Reviewed   CBC W/ AUTO DIFFERENTIAL - Abnormal; Notable for the following:        Result Value    Hemoglobin 10.3 (*)     Hematocrit 32.5 (*)     MCV 81 (*)     MCH 25.7 (*)     MCHC 31.7 (*)     RDW 15.7 (*)     All other components within normal limits   COMPREHENSIVE METABOLIC PANEL - Abnormal; Notable for the following:     CO2 20 (*)     Glucose 438 (*)     Total Protein 8.6 (*)     eGFR if  55 (*)     eGFR if non  48 (*)     All other components within normal limits   B-TYPE NATRIURETIC PEPTIDE - Abnormal; Notable for the following:      (*)     All other components within normal limits   PHOSPHORUS - Abnormal; Notable for the following:     Phosphorus 6.8 (*)     All other components within normal limits   LACTIC ACID, PLASMA - Abnormal; Notable for the following:     Lactate (Lactic Acid) 2.8 (*)     All other components within normal limits   URINALYSIS - Abnormal; Notable for the following:     Appearance, UA Hazy (*)     Protein, UA 1+ (*)     Glucose, UA 3+ (*)     All other components within normal limits   URINALYSIS MICROSCOPIC - Abnormal; Notable for the following:     Bacteria, UA Many (*)     All other components within normal limits   ISTAT PROCEDURE - Abnormal; Notable for the following:     POC PH 7.207 (*)     POC PCO2 51.1 (*)     POC PO2 123 (*)     POC HCO3 20.3 (*)     POC TCO2 22 (*)     All other components within normal limits   ISTAT PROCEDURE - Abnormal; Notable for the following:     POC PO2 158 (*)     POC HCO3 20.4 (*)     POC TCO2 21 (*)     All other components within normal limits   PROTIME-INR   TROPONIN I   MAGNESIUM     EKG Readings: (Independently Interpreted)   Initial Reading: No STEMI.   Sinus tachycardia, rate 139,  left axis deviation, + LVH, normal intervals, nonspecific ST abnormalities.          Medical Decision Making:   History:   I obtained history from: EMS provider and someone other than patient.  Old Medical Records: I decided to obtain old medical records.  Old Records Summarized: records from clinic visits and other records.  Independently Interpreted Test(s):   I have ordered and independently interpreted X-rays - see prior notes.  I have ordered and independently interpreted EKG Reading(s) - see prior notes  Clinical Tests:   Lab Tests: Ordered and Reviewed  Radiological Study: Ordered and Reviewed  Medical Tests: Ordered and Reviewed    Additional MDM:   Comments:   Emergent evaluation of an 82 year old female has a past medical history of dementia, diabetes mellitus, and hypertension presents to the ED via EMS after having been found unresponsive.  Patient intubated secondary to failure to oxygenate as well as to protect airway.  She has been pancultured prior to starting broad spectrum antibiotics.  She will be admitted to te ICU in critical condiition.      Code Status - FULL code. .          Scribe Attestation:   Scribe #1: I performed the above scribed service and the documentation accurately describes the services I performed. I attest to the accuracy of the note.    Attending Attestation:           Physician Attestation for Scribe:  Physician Attestation Statement for Scribe #1: I, Karlos Pandey MD, reviewed documentation, as scribed by Xavier Brannon in my presence, and it is both accurate and complete.                 ED Course     Clinical Impression:   The primary encounter diagnosis was Respiratory failure with hypoxia and hypercapnia, unspecified chronicity. Diagnoses of Heart failure, Chronic respiratory failure with hypoxia, On mechanically assisted ventilation, Acute cystitis without hematuria, Essential hypertension, Type 2 diabetes mellitus with diabetic nephropathy, with long-term current  use of insulin, Dementia without behavioral disturbance, unspecified dementia type, Anemia of chronic disease, and Neck malignant neoplasm were also pertinent to this visit.    Disposition:   Disposition: Admitted  Condition: Critical       Karlos Pandey MD  04/10/17 0552

## 2017-03-11 NOTE — CONSULTS
Consult Note  Critical Care    Consult Requested By: Dr Starks  Reason for Consult: Vent     SUBJECTIVE:     History of Present Illness:  Patient is a 82 y.o. female presents with a PMHx of HTN, DM and dementia . She was found at NH unresponsive . Brought to ER - agonal resp - thus intubated . Currently sedated - Nurse reports moves all ext equally     Review of patient's allergies indicates:  No Known Allergies    Past Medical History:   Diagnosis Date    Cancer     Dementia     Diabetes mellitus     Hypertension     Thyroid disease      History reviewed. No pertinent surgical history.  History reviewed. No pertinent family history.  Social History   Substance Use Topics    Smoking status: Never Smoker    Smokeless tobacco: None    Alcohol use No        Review of Systems:  Review of systems not obtained due to patient factors Intubated .    Prescriptions Prior to Admission   Medication Sig Dispense Refill Last Dose    aspirin (ECOTRIN) 81 MG EC tablet Take 81 mg by mouth once daily.       donepezil (ARICEPT) 10 MG tablet Take 10 mg by mouth every evening.       lisinopril (PRINIVIL,ZESTRIL) 20 MG tablet Take 20 mg by mouth once daily.       metformin (GLUCOPHAGE) 500 MG tablet Take 500 mg by mouth 2 (two) times daily with meals.       mirtazapine (REMERON) 15 MG tablet Take 15 mg by mouth every evening.          Current Facility-Administered Medications   Medication    0.9%  NaCl infusion    acetaminophen tablet 500 mg    aspirin EC tablet 81 mg    chlorhexidine 0.12 % solution 15 mL    ciprofloxacin (CIPRO)400mg/200ml D5W IVPB 400 mg    dextrose 50% injection 12.5 g    dextrose 50% injection 25 g    donepezil tablet 10 mg    enoxaparin injection 40 mg    fentaNYL (SUBLIMAZE) 2,500 mcg in dextrose 5 % 250 mL infusion    glucagon (human recombinant) injection 1 mg    glucose chewable tablet 16 g    glucose chewable tablet 24 g    hydrALAZINE injection 10 mg    insulin aspart pen 0-5  Units    lisinopril tablet 20 mg    ondansetron injection 8 mg    pantoprozole 40 mg in dextrose 5 % 100 mL infusion (ready to mix system)    piperacillin-tazobactam 4.5 g in dextrose 5 % 100 mL IVPB (ready to mix system)    pneumoc 13-emma conj-dip cr(PF) 0.5 mL 0.5 mL    promethazine (PHENERGAN) 12.5 mg in dextrose 5 % 50 mL IVPB    propofol (DIPRIVAN) 10 mg/mL infusion    ramelteon tablet 8 mg    vancomycin 750 mg in dextrose 5 % 250 mL IVPB (ready to mix system)         OBJECTIVE:     Vital Signs (Most Recent)  Temp: 98 °F (36.7 °C) (03/11/17 0730)  Pulse: 72 (03/11/17 0905)  Resp: 15 (03/11/17 0905)  BP: (!) 82/52 (03/11/17 0905)  SpO2: 100 % (03/11/17 0905)    Vital Signs Range (Last 24H):  Temp:  [97.3 °F (36.3 °C)-98.8 °F (37.1 °C)]   Pulse:  []   Resp:  [7-33]   BP: ()/()   SpO2:  [93 %-100 %]   Ventilator Data (Last 24H):     Vent Mode: PRVC  Oxygen Concentration (%):  [40-60] 40  Resp Rate Total:  [16 br/min-24 br/min] 16 br/min  Vt Set:  [400 mL] 400 mL  PEEP/CPAP:  [5 cmH20] 5 cmH20  Mean Airway Pressure:  [7.8 loN97-62.2 cmH20] 7.8 cmH20    Hemodynamic Parameters (Last 24H):       Physical Exam:  General: cachectic  Head: normocephalic, atraumatic  Eyes:  conjunctivae/corneas clear. PERRL.  Nose: Nares normal. Septum midline. Mucosa normal. No drainage or sinus tenderness., no discharge  Throat: limited visualization due to endotracheal tube  Neck: supple, symmetrical, trachea midline, no JVD and thyroid not enlarged, symmetric, no tenderness/mass/nodules  Lungs:  clear to auscultation bilaterally and normal respiratory effort  Heart: S1, S2 normal  Abdomen: soft, non-tender non-distented; bowel sounds normal; no masses,  no organomegaly  Extremities: no cyanosis or edema, or clubbing  Skin: Skin color, texture, turgor normal. No rashes or lesions  Neurologic: Sedated        Laboratory:  CBC:   Recent Labs  Lab 03/11/17  0255   WBC 9.27   RBC 3.97*   HGB 10.1*   HCT 32.2*       MCV 81*   MCH 25.4*   MCHC 31.4*     BMP:   Recent Labs  Lab 03/10/17  2235 03/11/17  0255   * 315*    138   K 3.8 3.8    106   CO2 20* 17*   BUN 19 17   CREATININE 1.1 0.9   CALCIUM 9.4 9.2   MG 2.2  --      CMP:   Recent Labs  Lab 03/11/17  0255   *   CALCIUM 9.2   ALBUMIN 3.3*   PROT 7.9      K 3.8   CO2 17*      BUN 17   CREATININE 0.9   ALKPHOS 59   ALT 14   AST 26   BILITOT 0.6       Vent Mode: PRVC  Oxygen Concentration (%):  [40-60] 40  Resp Rate Total:  [16 br/min-24 br/min] 16 br/min  Vt Set:  [400 mL] 400 mL  PEEP/CPAP:  [5 cmH20] 5 cmH20  Mean Airway Pressure:  [7.8 veL28-92.2 cmH20] 7.8 cmH20       ABGs:   Recent Labs  Lab 03/11/17  0329   PH 7.378   PCO2 37.0   PO2 203*   HCO3 21.8*   POCSATURATED 100   BE -3       Chest X-Ray: I personally reviewed the films and findings are:    Some perihilar infiltrates     ASSESSMENT/PLAN:     1) Found down - etiology ? - Nurse reports that she moves all ext spontaneously   2) Resp - was intubated for airway protection - her CXR does seem to have some perihilar infiltrates - DDX pulm edema vs aspiration - vent as above . Will wean as tolerated - on Zosyn   3) Poss pulm edema - will check an Echo   4) AG MA - lactic acid 2.8

## 2017-03-11 NOTE — H&P
[facility] Medicine  History & Physical    Patient Name: Teodoro Whitehead  MRN: [id number]  Admission Date: 3/10/2017  Attending Physician: Valentina Ferris MD   Primary Care Provider: Primary Doctor No         Patient information was obtained from ER records.     Subjective:     Principal Problem:Acute respiratory failure with hypoxia    Chief Complaint: Unresponsiveness intoday.    HPI: Mrs. Teodoro Whitehead is a 82 y.o. female [facility] with essential hypertension, type 2 diabetes mellitus (HbA1c unknown), anemia of chronic disease, and dementia who presents to [facility] ED with complaints of unresponsiveness tonight.  She was found in this state at around 9:15 PM tonight.  She was reportedly shallow breathing.  EMS was activated and noted that her oxygen saturation on ambient air was 70% and that her heart rate was in the 140s.  Further history is limited at this time.    Chart Review:  Patient has not had any recent hospitalizations or outpatient clinic visits within the system.    Past Medical History:   Diagnosis Date    Cancer     Dementia     Diabetes mellitus     Hypertension     Thyroid disease        History reviewed. No pertinent surgical history.    Review of patient's allergies indicates:  No Known Allergies    No current facility-administered medications on file prior to encounter.      Current Outpatient Prescriptions on File Prior to Encounter   Medication Sig    aspirin (ECOTRIN) 81 MG EC tablet Take 81 mg by mouth once daily.    donepezil (ARICEPT) 10 MG tablet Take 10 mg by mouth every evening.    lisinopril (PRINIVIL,ZESTRIL) 20 MG tablet Take 20 mg by mouth once daily.    metformin (GLUCOPHAGE) 500 MG tablet Take 500 mg by mouth 2 (two) times daily with meals.    mirtazapine (REMERON) 15 MG tablet Take 15 mg by mouth every evening.     Family History     None        Social History Main Topics    Smoking status: Never Smoker     Smokeless tobacco: Not on file    Alcohol use No    Drug use: No    Sexual activity: Not on file     Review of Systems   Unable to perform ROS: Intubated     Objective:     Vital Signs (Most Recent):  Temp: 98.8 °F (37.1 °C) (03/11/17 0330)  Pulse: 78 (03/11/17 0331)  Resp: (!) 26 (03/11/17 0331)  BP: 97/69 (03/11/17 0338)  SpO2: 100 % (03/11/17 0331) Vital Signs (24h Range):  Temp:  [97.3 °F (36.3 °C)-98.8 °F (37.1 °C)] 98.8 °F (37.1 °C)  Pulse:  [] 78  Resp:  [7-32] 26  SpO2:  [93 %-100 %] 100 %  BP: ()/() 97/69     Weight: 49.5 kg (109 lb 2 oz)  Body mass index is 19.96 kg/(m^2).    Physical Exam   Constitutional: She appears well-developed and well-nourished. No distress.   HENT:   Head: Normocephalic and atraumatic.   Right Ear: External ear normal.   Left Ear: External ear normal.   Nose: Nose normal.   OGT and ETT in place with some blood present in mouth   Eyes: Right eye exhibits no discharge. Left eye exhibits no discharge.   Cardiovascular: Normal rate, regular rhythm, normal heart sounds and intact distal pulses.  Exam reveals no friction rub.    No murmur heard.  Pulmonary/Chest:   Mechanically ventilated with coarse rhonchi.  No murmurs or rubs.   Abdominal: Soft. Bowel sounds are normal. She exhibits no distension. There is no tenderness. There is no rebound and no guarding.   Musculoskeletal: Normal range of motion. She exhibits no edema.   Skin: Skin is warm and dry. She is not diaphoretic. There is erythema.   Psychiatric: She has a normal mood and affect. Her behavior is normal. Judgment and thought content normal.   Nursing note and vitals reviewed.       Significant Labs: All pertinent labs within the past 24 hours have been reviewed.    Significant Imaging: I have reviewed and interpreted all pertinent imaging results/findings within the past 24 hours.    Assessment/Plan:     * Acute respiratory failure with hypoxia  The etiology of her respiratory failure is unclear at  this time.  I have reviewed the chest X-ray and it reveals no focal infiltrates or pleural effusions.  She was intubated for airway protection and for hypoxia as she was with agonal respirations.  A post-intubation ABG was obtained to and was revealing for 7.207  51.1  123  20.3 on a ventilator setting of AC 14  400  +5  60%.  Will consult Pulmonary ventilator management.    On mechanically assisted ventilation  As addressed above.    UTI (urinary tract infection)  Her urinalysis is significant for a specific gravity of 1.010, 1+ protein, +3 ketones, and many bacteria.  Urine and blood cultures are pending.  Will start empiric antibiotic therapy.    Essential hypertension  Patient's blood pressure is better-controlled; will continue home regimen of lisinopril, and provide as-needed hydralazine.    Type 2 diabetes mellitus  She is on a home regimen of metformin but will start basal insulin therapy along with insulin sliding scale.    Dementia  Will continue her home regimen of donepezil.      Anemia of chronic disease  The patient's H/H is stable and consistent with previous laboratory measurements, and the patient exhibits no signs or symptoms of acute bleeding; there is no indication for transfusion.  Will continue to monitor.    VTE Risk Mitigation         Ordered     enoxaparin injection 40 mg  Daily     Route:  Subcutaneous        03/11/17 0345     Medium Risk of VTE  Once      03/11/17 0345            Total time spent on case: 60 minutes.        Dorcas Dias M.D.  Staff Nocturnist  Department of Hospital Medicine  [facility]  Pager: [phone]

## 2017-03-11 NOTE — ASSESSMENT & PLAN NOTE
She is on a home regimen of metformin but will start basal insulin therapy along with insulin sliding scale.

## 2017-03-11 NOTE — SUBJECTIVE & OBJECTIVE
Past Medical History:   Diagnosis Date    Cancer     Dementia     Diabetes mellitus     Hypertension     Thyroid disease        History reviewed. No pertinent surgical history.    Review of patient's allergies indicates:  No Known Allergies    No current facility-administered medications on file prior to encounter.      Current Outpatient Prescriptions on File Prior to Encounter   Medication Sig    aspirin (ECOTRIN) 81 MG EC tablet Take 81 mg by mouth once daily.    donepezil (ARICEPT) 10 MG tablet Take 10 mg by mouth every evening.    lisinopril (PRINIVIL,ZESTRIL) 20 MG tablet Take 20 mg by mouth once daily.    metformin (GLUCOPHAGE) 500 MG tablet Take 500 mg by mouth 2 (two) times daily with meals.    mirtazapine (REMERON) 15 MG tablet Take 15 mg by mouth every evening.     Family History     None        Social History Main Topics    Smoking status: Never Smoker    Smokeless tobacco: Not on file    Alcohol use No    Drug use: No    Sexual activity: Not on file     Review of Systems   Unable to perform ROS: Intubated     Objective:     Vital Signs (Most Recent):  Temp: 98.8 °F (37.1 °C) (03/11/17 0330)  Pulse: 78 (03/11/17 0331)  Resp: (!) 26 (03/11/17 0331)  BP: 97/69 (03/11/17 0338)  SpO2: 100 % (03/11/17 0331) Vital Signs (24h Range):  Temp:  [97.3 °F (36.3 °C)-98.8 °F (37.1 °C)] 98.8 °F (37.1 °C)  Pulse:  [] 78  Resp:  [7-32] 26  SpO2:  [93 %-100 %] 100 %  BP: ()/() 97/69     Weight: 49.5 kg (109 lb 2 oz)  Body mass index is 19.96 kg/(m^2).    Physical Exam   Constitutional: She appears well-developed and well-nourished. No distress.   HENT:   Head: Normocephalic and atraumatic.   Right Ear: External ear normal.   Left Ear: External ear normal.   Nose: Nose normal.   OGT and ETT in place with some blood present in mouth   Eyes: Right eye exhibits no discharge. Left eye exhibits no discharge.   Cardiovascular: Normal rate, regular rhythm, normal heart sounds and intact distal  pulses.  Exam reveals no friction rub.    No murmur heard.  Pulmonary/Chest:   Mechanically ventilated with coarse rhonchi.  No murmurs or rubs.   Abdominal: Soft. Bowel sounds are normal. She exhibits no distension. There is no tenderness. There is no rebound and no guarding.   Musculoskeletal: Normal range of motion. She exhibits no edema.   Skin: Skin is warm and dry. She is not diaphoretic. There is erythema.   Psychiatric: She has a normal mood and affect. Her behavior is normal. Judgment and thought content normal.   Nursing note and vitals reviewed.       Significant Labs: All pertinent labs within the past 24 hours have been reviewed.    Significant Imaging: I have reviewed and interpreted all pertinent imaging results/findings within the past 24 hours.

## 2017-03-11 NOTE — ED NOTES
Patient received via EMS ambu with 100% oxygen to ER 17. Dr. Pandey at bedside for intubation. Patient intubated with 7.5 ETT secured at 24cm at teeth. Placed on servoi ventilator with current settings PRVC rate 24, tidal volume 400ml peep +5, FI02 60% with all alarm on, set and functioning. Sputum specimen obtained. Will continue to monitor.

## 2017-03-11 NOTE — ASSESSMENT & PLAN NOTE
Her urinalysis is significant for a specific gravity of 1.010, 1+ protein, +3 ketones, and many bacteria.  Urine and blood cultures are pending.  Will start empiric antibiotic therapy.

## 2017-03-11 NOTE — ASSESSMENT & PLAN NOTE
Patient's blood pressure is better-controlled; will continue home regimen of lisinopril, and provide as-needed hydralazine.

## 2017-03-11 NOTE — ED TRIAGE NOTES
PT FROM [facility], NORMALLY AMBULATORY AND SPEAKS IN NATIVE TONGUE AND FOLLOWS DIRECTIONS. PT UNRESPONSIVE AFTER SOB. PER EMS, PT SHALLOW BREATHING UPON ARRIVAL

## 2017-03-12 PROBLEM — F03.90 DEMENTIA WITHOUT BEHAVIORAL DISTURBANCE: Status: ACTIVE | Noted: 2017-03-11

## 2017-03-12 LAB
ALLENS TEST: ABNORMAL
ANION GAP SERPL CALC-SCNC: 8 MMOL/L
BACTERIA UR CULT: NORMAL
BASOPHILS # BLD AUTO: 0.02 K/UL
BASOPHILS NFR BLD: 0.2 %
BUN SERPL-MCNC: 9 MG/DL
CALCIUM SERPL-MCNC: 8.4 MG/DL
CHLORIDE SERPL-SCNC: 109 MMOL/L
CO2 SERPL-SCNC: 20 MMOL/L
CREAT SERPL-MCNC: 0.8 MG/DL
DELSYS: ABNORMAL
DIASTOLIC DYSFUNCTION: YES
DIFFERENTIAL METHOD: ABNORMAL
EOSINOPHIL # BLD AUTO: 0.3 K/UL
EOSINOPHIL NFR BLD: 2.8 %
ERYTHROCYTE [DISTWIDTH] IN BLOOD BY AUTOMATED COUNT: 15.6 %
ERYTHROCYTE [SEDIMENTATION RATE] IN BLOOD BY WESTERGREN METHOD: 16 MM/H
EST. GFR  (AFRICAN AMERICAN): >60 ML/MIN/1.73 M^2
EST. GFR  (NON AFRICAN AMERICAN): >60 ML/MIN/1.73 M^2
ESTIMATED PA SYSTOLIC PRESSURE: 27.01
FIO2: 0.4
GLUCOSE SERPL-MCNC: 154 MG/DL
HCO3 UR-SCNC: 20.5 MMOL/L (ref 24–28)
HCT VFR BLD AUTO: 23 %
HGB BLD-MCNC: 7.3 G/DL
LYMPHOCYTES # BLD AUTO: 1.4 K/UL
LYMPHOCYTES NFR BLD: 15.8 %
MAGNESIUM SERPL-MCNC: 1.5 MG/DL
MCH RBC QN AUTO: 25.2 PG
MCHC RBC AUTO-ENTMCNC: 31.7 %
MCV RBC AUTO: 79 FL
MIN VOL: 6.2
MITRAL VALVE REGURGITATION: ABNORMAL
MODE: ABNORMAL
MONOCYTES # BLD AUTO: 0.6 K/UL
MONOCYTES NFR BLD: 6.1 %
NEUTROPHILS # BLD AUTO: 6.8 K/UL
NEUTROPHILS NFR BLD: 75 %
PCO2 BLDA: 33.9 MMHG (ref 35–45)
PEEP: 5
PH SMN: 7.39 [PH] (ref 7.35–7.45)
PHOSPHATE SERPL-MCNC: 2.1 MG/DL
PLATELET # BLD AUTO: 221 K/UL
PMV BLD AUTO: 10.5 FL
PO2 BLDA: 149 MMHG (ref 80–100)
POC BE: -4 MMOL/L
POC SATURATED O2: 99 % (ref 95–100)
POC TCO2: 21 MMOL/L (ref 23–27)
POCT GLUCOSE: 138 MG/DL (ref 70–110)
POCT GLUCOSE: 159 MG/DL (ref 70–110)
POCT GLUCOSE: 185 MG/DL (ref 70–110)
POCT GLUCOSE: 217 MG/DL (ref 70–110)
POTASSIUM SERPL-SCNC: 3.7 MMOL/L
RBC # BLD AUTO: 2.9 M/UL
RETIRED EF AND QEF - SEE NOTES: 35 (ref 55–65)
SAMPLE: ABNORMAL
SITE: ABNORMAL
SODIUM SERPL-SCNC: 137 MMOL/L
SP02: 100
TRICUSPID VALVE REGURGITATION: ABNORMAL
VANCOMYCIN SERPL-MCNC: 2.1 UG/ML
VT: 400
WBC # BLD AUTO: 9.09 K/UL

## 2017-03-12 PROCEDURE — C9113 INJ PANTOPRAZOLE SODIUM, VIA: HCPCS | Performed by: EMERGENCY MEDICINE

## 2017-03-12 PROCEDURE — 83735 ASSAY OF MAGNESIUM: CPT

## 2017-03-12 PROCEDURE — 80048 BASIC METABOLIC PNL TOTAL CA: CPT

## 2017-03-12 PROCEDURE — 63600175 PHARM REV CODE 636 W HCPCS: Performed by: EMERGENCY MEDICINE

## 2017-03-12 PROCEDURE — 80202 ASSAY OF VANCOMYCIN: CPT

## 2017-03-12 PROCEDURE — 31720 CLEARANCE OF AIRWAYS: CPT

## 2017-03-12 PROCEDURE — 99900026 HC AIRWAY MAINTENANCE (STAT)

## 2017-03-12 PROCEDURE — 63600175 PHARM REV CODE 636 W HCPCS: Performed by: INTERNAL MEDICINE

## 2017-03-12 PROCEDURE — 99900035 HC TECH TIME PER 15 MIN (STAT)

## 2017-03-12 PROCEDURE — 85025 COMPLETE CBC W/AUTO DIFF WBC: CPT

## 2017-03-12 PROCEDURE — 20000000 HC ICU ROOM

## 2017-03-12 PROCEDURE — 25000003 PHARM REV CODE 250: Performed by: INTERNAL MEDICINE

## 2017-03-12 PROCEDURE — 25500020 PHARM REV CODE 255: Performed by: HOSPITALIST

## 2017-03-12 PROCEDURE — 25000003 PHARM REV CODE 250: Performed by: EMERGENCY MEDICINE

## 2017-03-12 PROCEDURE — 84100 ASSAY OF PHOSPHORUS: CPT

## 2017-03-12 PROCEDURE — 36600 WITHDRAWAL OF ARTERIAL BLOOD: CPT

## 2017-03-12 PROCEDURE — 94761 N-INVAS EAR/PLS OXIMETRY MLT: CPT

## 2017-03-12 PROCEDURE — 94003 VENT MGMT INPAT SUBQ DAY: CPT

## 2017-03-12 PROCEDURE — 36415 COLL VENOUS BLD VENIPUNCTURE: CPT

## 2017-03-12 RX ADMIN — VANCOMYCIN HYDROCHLORIDE 750 MG: 750 INJECTION, POWDER, LYOPHILIZED, FOR SOLUTION INTRAVENOUS at 12:03

## 2017-03-12 RX ADMIN — DEXTROSE 8 MG/HR: 50 INJECTION, SOLUTION INTRAVENOUS at 06:03

## 2017-03-12 RX ADMIN — PROPOFOL 15 MCG/KG/MIN: 10 INJECTION, EMULSION INTRAVENOUS at 03:03

## 2017-03-12 RX ADMIN — DEXTROSE 8 MG/HR: 50 INJECTION, SOLUTION INTRAVENOUS at 07:03

## 2017-03-12 RX ADMIN — DONEPEZIL HYDROCHLORIDE 10 MG: 10 TABLET, FILM COATED ORAL at 09:03

## 2017-03-12 RX ADMIN — INSULIN ASPART 1 UNITS: 100 INJECTION, SOLUTION INTRAVENOUS; SUBCUTANEOUS at 05:03

## 2017-03-12 RX ADMIN — CHLORHEXIDINE GLUCONATE 15 ML: 1.2 RINSE ORAL at 08:03

## 2017-03-12 RX ADMIN — IOHEXOL 70 ML: 350 INJECTION, SOLUTION INTRAVENOUS at 10:03

## 2017-03-12 RX ADMIN — ENOXAPARIN SODIUM 40 MG: 100 INJECTION SUBCUTANEOUS at 11:03

## 2017-03-12 RX ADMIN — DEXTROSE 8 MG/HR: 50 INJECTION, SOLUTION INTRAVENOUS at 03:03

## 2017-03-12 RX ADMIN — DEXTROSE 8 MG/HR: 50 INJECTION, SOLUTION INTRAVENOUS at 08:03

## 2017-03-12 RX ADMIN — DEXTROSE 8 MG/HR: 50 INJECTION, SOLUTION INTRAVENOUS at 12:03

## 2017-03-12 RX ADMIN — PIPERACILLIN AND TAZOBACTAM 4.5 G: 4; .5 INJECTION, POWDER, FOR SOLUTION INTRAVENOUS at 09:03

## 2017-03-12 RX ADMIN — PIPERACILLIN AND TAZOBACTAM 4.5 G: 4; .5 INJECTION, POWDER, FOR SOLUTION INTRAVENOUS at 06:03

## 2017-03-12 RX ADMIN — ASPIRIN 81 MG: 81 TABLET, COATED ORAL at 08:03

## 2017-03-12 RX ADMIN — CHLORHEXIDINE GLUCONATE 15 ML: 1.2 RINSE ORAL at 09:03

## 2017-03-12 RX ADMIN — CIPROFLOXACIN 400 MG: 2 INJECTION, SOLUTION INTRAVENOUS at 03:03

## 2017-03-12 RX ADMIN — PIPERACILLIN AND TAZOBACTAM 4.5 G: 4; .5 INJECTION, POWDER, FOR SOLUTION INTRAVENOUS at 03:03

## 2017-03-12 RX ADMIN — PROPOFOL 20 MCG/KG/MIN: 10 INJECTION, EMULSION INTRAVENOUS at 03:03

## 2017-03-12 NOTE — PLAN OF CARE
Problem: Patient Care Overview  Goal: Plan of Care Review  Outcome: Ongoing (interventions implemented as appropriate)  CT scan done of neck and chest. MD talked to son about condition. Tube feedings initiated. CPAP trial done and failed due to needing the back up mode of ventilator. Received vancomycin. Remains on propofol and protonix.

## 2017-03-12 NOTE — PROGRESS NOTES
[facility] Medicine  Progress Note    Patient Name: Teodoro Whitehead  MRN: [id number]  Patient Class: IP- Inpatient   Admission Date: 3/10/2017  Length of Stay: 1 days  Attending Physician: Valentina Ferris MD  Primary Care Provider: Primary Doctor No        Subjective:     Principal Problem:Acute respiratory failure with hypoxia    HPI:  Mrs. Teodoro Whitehead is a 82 y.o. female [facility] resident with essential hypertension, type 2 diabetes mellitus (HbA1c unknown), anemia of chronic disease, and dementia who presents to [facility] ED with complaints of unresponsiveness tonight.  She was found in this state at around 9:15 PM tonight.  She was reportedly shallow breathing.  EMS was activated and noted that her oxygen saturation on ambient air was 70% and that her heart rate was in the 140s.  Further history is limited at this time.    Hospital Course:  Mrs. Teodoro Whitehead is a 82 y.o. female [facility] resident with essential hypertension, type 2 diabetes mellitus (HbA1c unknown), anemia of chronic disease, and dementia who presents to [facility] ED with complaints of unresponsiveness  She was reportedly shallow breathing.  EMS was activated and noted that her oxygen saturation on ambient air was 70% and that her heart rate was in the 140s.Patient has been intubated for acute hypoxic respiratory failure,pulmonolopgy doing vent management.likely duo to aspiration,on IV Abx,also has UTI,GNR of urine culture.  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      Past Medical History:   Diagnosis Date    Cancer     Dementia     Diabetes mellitus     Hypertension     Thyroid disease        History reviewed. No pertinent surgical history.    Review of patient's allergies indicates:  No Known Allergies    No current facility-administered medications on file prior to encounter.       Current Outpatient Prescriptions on File Prior to Encounter   Medication Sig    aspirin (ECOTRIN) 81 MG EC tablet Take 81 mg by mouth once daily.    donepezil (ARICEPT) 10 MG tablet Take 10 mg by mouth every evening.    lisinopril (PRINIVIL,ZESTRIL) 20 MG tablet Take 20 mg by mouth once daily.    metformin (GLUCOPHAGE) 500 MG tablet Take 500 mg by mouth 2 (two) times daily with meals.    mirtazapine (REMERON) 15 MG tablet Take 15 mg by mouth every evening.     Family History     None        Social History Main Topics    Smoking status: Never Smoker    Smokeless tobacco: Not on file    Alcohol use No    Drug use: No    Sexual activity: Not on file     Review of Systems   Unable to perform ROS: Intubated     Objective:     Vital Signs (Most Recent):  Temp: 99 °F (37.2 °C) (03/12/17 0700)  Pulse: 94 (03/12/17 0903)  Resp: (!) 31 (03/12/17 0903)  BP: 113/63 (03/12/17 0903)  SpO2: (!) 86 % (03/12/17 0903) Vital Signs (24h Range):  Temp:  [98.2 °F (36.8 °C)-99.2 °F (37.3 °C)] 99 °F (37.2 °C)  Pulse:  [] 94  Resp:  [11-31] 31  SpO2:  [34 %-100 %] 86 %  BP: ()/(48-80) 113/63     Weight: 49.5 kg (109 lb 2 oz)  Body mass index is 19.96 kg/(m^2).    Physical Exam   Constitutional: She appears well-developed and well-nourished. No distress.   HENT:   Head: Normocephalic and atraumatic.   Right Ear: External ear normal.   Left Ear: External ear normal.   Nose: Nose normal.   OGT and ETT in place with some blood present in mouth   Eyes: Right eye exhibits no discharge. Left eye exhibits no discharge.   Cardiovascular: Normal rate, regular rhythm, normal heart sounds and intact distal pulses.  Exam reveals no friction rub.    No murmur heard.  Pulmonary/Chest:   Mechanically ventilated with coarse rhonchi.  No murmurs or rubs.   Abdominal: Soft. Bowel sounds are normal. She exhibits no distension. There is no tenderness. There is no rebound and no guarding.   Musculoskeletal: Normal range of motion. She  exhibits no edema.   Skin: Skin is warm and dry. She is not diaphoretic. There is erythema.   Psychiatric: She has a normal mood and affect. Her behavior is normal. Judgment and thought content normal.   Nursing note and vitals reviewed.       Significant Labs: All pertinent labs within the past 24 hours have been reviewed.    Significant Imaging: I have reviewed and interpreted all pertinent imaging results/findings within the past 24 hours.    Assessment/Plan:      * Acute respiratory failure with hypoxia  The etiology of her respiratory failure may duo to aspiration event..  She was intubated for airway protection and for hypoxia as she was with agonal respirations.  A post-intubation ABG was obtained to and was revealing for 7.207  51.1  123  20.3 on a ventilator setting of AC 14  400  +5  60%. Pulmonary doing ventilator management.son mention patient has vita diagnosed with cancer in neck after biopsy,does not know what kind,will do CT chest and neck,has goiter in neck.    On mechanically assisted ventilation  As addressed above.    UTI (urinary tract infection)  Her urinalysis is significant for a specific gravity of 1.010, 1+ protein, +3 ketones, and many bacteria.  Urine and blood cultures are pending.  GNR on IV Abx.    Essential hypertension  Patient's blood pressure is better-controlled; provide as-needed hydralazine.    Type 2 diabetes mellitus  She is on a home regimen of metformin but will start basal insulin therapy along with insulin sliding scale.    Dementia without behavioral disturbance  Will continue her home regimen of donepezil.      Anemia of chronic disease  The patient's H/H is dropped today with no sign of active bleeding,will continue monitor.    VTE Risk Mitigation         Ordered     enoxaparin injection 40 mg  Daily     Route:  Subcutaneous        03/11/17 0345     Medium Risk of VTE  Once      03/11/17 0345          Valentina Ferris MD  Department of Hospital Medicine    [facility]

## 2017-03-12 NOTE — PLAN OF CARE
Problem: Patient Care Overview  Goal: Plan of Care Review  Outcome: Ongoing (interventions implemented as appropriate)    03/12/17 0640   Coping/Psychosocial   Plan Of Care Reviewed With patient       Patient remains intubated and sedated, Easily gets agitated on stimulation. Lung sounds coarse bilaterally. Whitehead noted with adequate urine output. With bloody secretions noted on the ETT upon suctioning MD all aware of the situation. No Fall or injury no skin breakdown noted.

## 2017-03-12 NOTE — CONSULTS
[facility]  Adult Nutrition  Consult Note    SUMMARY     Recommendations    Recommendation/Intervention: 1) RD to follow up Monday  Goals: 1) At least 80% EEN met 2) Pt to tolerate TF  Nutrition Goal Status: new  Communication of RD Recs: reviewed with nursing    Continuum of Care Plan    Referral to Outpatient Services:  (discharge plan to be determined)    Reason for Assessment    Reason for Assessment: physician consult     Relevent Medical History: cancer, dementia, DM, HTN   Interdisciplinary Rounds: did not attend     General Information Comments: Pt was unable to provide hx on admit and now intubated.  Obtained information from chart.    Nutrition Prescription Ordered    Current Diet Order: NPO     Current Nutrition Support Formula Ordered:  (Diabetasource)  Current Nutrition Support Rate Ordered: 45 (ml) (mls/hr)     Evaluation of Received Nutrients/Fluid Intake    Enteral Calories (kcal): 1296  Enteral Protein (gm): 65 (108 gms CHO)  Enteral (Free Water) Fluid (mL): 883       Other Fluid (mL): 3185 (per I&O)    Comments: Unable to determine at this time the adequacy of enteral nutrition        Nutrition Risk Screen     Nutrition Risk Screen: no indicators present      Labs/Tests/Procedures/Meds    Diagnostic Test/Procedure Review: reviewed, pertinent  Pertinent Labs Reviewed: reviewed, pertinent      BMP  Lab Results   Component Value Date     03/12/2017    K 3.7 03/12/2017     03/12/2017    CO2 20 (L) 03/12/2017    BUN 9 03/12/2017    CREATININE 0.8 03/12/2017    CALCIUM 8.4 (L) 03/12/2017    ANIONGAP 8 03/12/2017    ESTGFRAFRICA >60 03/12/2017    EGFRNONAA >60 03/12/2017       Recent Labs  Lab 03/12/17  1146   POCTGLUCOSE 185*     No results found for: CHOL  No results found for: HDL  No results found for: LDLCALC  No results found for: TRIG  No results found for: CHOLHDL    Pertinent Medications Reviewed: reviewed, pertinent  Scheduled Meds:   aspirin  81 mg Oral Daily     chlorhexidine  15 mL Mouth/Throat BID    donepezil  10 mg Oral QHS    enoxaparin  40 mg Subcutaneous Daily    piperacillin-tazobactam 4.5 g in dextrose 5 % 100 mL IVPB (ready to mix system)  4.5 g Intravenous Q8H     Continuous Infusions:   fentanyl 0 mcg/hr (03/12/17 1115)    pantoprozole 40 mg in dextrose 5 % 100 mL infusion (ready to mix system) 8 mg/hr (03/12/17 1300)    propofol 14.848 mcg/kg/min (03/12/17 1300)     PRN Meds:.acetaminophen, dextrose 50%, dextrose 50%, glucagon (human recombinant), glucose, glucose, hydrALAZINE, insulin aspart, ondansetron, pneumoc 13-emma conj-dip cr(PF), promethazine (PHENERGAN) IVPB, ramelteon, vancomycin (VANCOCIN) IVPB    Physical Findings    Overall Physical Appearance: on ventilator support, underweight  Tubes: orogastric tube  Oral/Mouth Cavity: bleeding, tooth/teeth missing  Skin:  (Burke score 14)    Anthropometrics    Height Method: Stated  Height (inches): 61.81 in  Weight Method: Bed Scale  Weight (kg): 49.5 kg     Ideal Body Weight (IBW), Female: 109.05 lb     % Ideal Body Weight, Female (lb): 100.07 lb  BMI (kg/m2): 20.08  BMI Grade:  (BMI <23 in >66 yo is considered underweight)     Anthropometrics (Special Considerations)         Estimated/Assessed Needs    Weight Used For Calorie Calculations: 49.5 kg (109 lb 2 oz)   Height (cm): 157 cm      RMR ([place]-St. Jeor Equation): 921.16      Weight Used For Protein Calculations: 49.5 kg (109 lb 2 oz) (59-99)     Monitor and Evaluation    Food and Nutrient Intake: energy intake  Food and Nutrient Adminstration: enteral and parenteral nutrition administration   Anthropometric Measurements: weight, weight change  Biochemical Data, Medical Tests and Procedures: electrolyte and renal panel, glucose/endocrine profile, lipid profile  Nutrition-Focused Physical Findings: skin     Nutrition Diagnosis    RD will follow up Monday when additional data available for determination    Nutrition Risk    Level of Risk:  (2 x  weekly)    Nutrition Follow-Up    RD Follow-up?: Yes    Assessment and Plan    No new Assessment & Plan notes have been filed under this hospital service since the last note was generated.  Service: Nutrition

## 2017-03-12 NOTE — PLAN OF CARE
Problem: Patient Care Overview  Goal: Individualization & Mutuality  Recommendation/Intervention: 1) RD to follow up Monday  Goals: 1) At least 80% EEN met 2) Pt to tolerate TF  Nutrition Goal Status: new  Communication of RD Recs: reviewed with nursing

## 2017-03-12 NOTE — PLAN OF CARE
Problem: Patient Care Overview  Goal: Plan of Care Review  Outcome: Ongoing (interventions implemented as appropriate)  Remains on ventilator on Propofol & fentanyl drips.  Easily aroused but does not follow simple commands.  NS @ 75cc/hr & Protonix drips.  VSS, afebrile. Pressure & fall prevention interventions on-going.

## 2017-03-12 NOTE — NURSING
Patient went to CT accompanied by respiratory. Patient was on portable ventilator and cardiac monitor.

## 2017-03-12 NOTE — PROGRESS NOTES
Progress Note  Pulmonology    Admit Date: 3/10/2017   LOS: 1 day     SUBJECTIVE:     Follow-up For: Acute Resp failure   No change - Nurse reports that she moves all ext - purposeful movement . No commands         Continuous Infusions:   fentanyl 3.8 mL/hr at 03/12/17 0600    pantoprozole 40 mg in dextrose 5 % 100 mL infusion (ready to mix system) 8 mg/hr (03/12/17 0827)    propofol 25 mcg/kg/min (03/12/17 0736)     Scheduled Meds:   aspirin  81 mg Oral Daily    chlorhexidine  15 mL Mouth/Throat BID    donepezil  10 mg Oral QHS    enoxaparin  40 mg Subcutaneous Daily    piperacillin-tazobactam 4.5 g in dextrose 5 % 100 mL IVPB (ready to mix system)  4.5 g Intravenous Q8H       Review of Systems:  Review of systems not obtained due to patient factors Intubated .    OBJECTIVE:     Vital Signs Range (Last 24H):  Temp:  [98.2 °F (36.8 °C)-99.2 °F (37.3 °C)]   Pulse:  []   Resp:  [11-31]   BP: ()/(48-80)   SpO2:  [34 %-100 %]     I & O (Last 24H):  Intake/Output Summary (Last 24 hours) at 03/12/17 0940  Last data filed at 03/12/17 0900   Gross per 24 hour   Intake          2893.34 ml   Output             2225 ml   Net           668.34 ml           Physical Exam:  General: no distress  Neck: ? enlarged thyroid - poss adenopathy   Lungs:  clear to auscultation bilaterally, normal respiratory effort and normal percussion bilaterally  Heart: regular rate and rhythm and no murmur  Abdomen: soft, non-tender non-distended; bowel sounds normal  Extremities: no cyanosis or edema, or clubbing  Neurologic: Sedated     Laboratory:  CBC:   Recent Labs  Lab 03/12/17  0148   WBC 9.09   RBC 2.90*   HGB 7.3*   HCT 23.0*      MCV 79*   MCH 25.2*   MCHC 31.7*     BMP:   Recent Labs  Lab 03/12/17  0148   *      K 3.7      CO2 20*   BUN 9   CREATININE 0.8   CALCIUM 8.4*   MG 1.5*     CMP:   Recent Labs  Lab 03/11/17  0255 03/12/17  0148   * 154*   CALCIUM 9.2 8.4*   ALBUMIN 3.3*  --     PROT 7.9  --     137   K 3.8 3.7   CO2 17* 20*    109   BUN 17 9   CREATININE 0.9 0.8   ALKPHOS 59  --    ALT 14  --    AST 26  --    BILITOT 0.6  --      ABGs:   Recent Labs  Lab 03/12/17  0458   PH 7.390   PCO2 33.9*   PO2 149*   HCO3 20.5*   POCSATURATED 99   BE -4       Chest X-Ray: I personally reviewed the films and findings are:    No change     ASSESSMENT/PLAN:     Active Hospital Problems    Diagnosis  POA    *Acute respiratory failure with hypoxia [J96.01]  Yes    On mechanically assisted ventilation [Z99.11]  Not Applicable    UTI (urinary tract infection) [N39.0]  Yes    Essential hypertension [I10]  Yes     Chronic    Type 2 diabetes mellitus [E11.9]  Yes     Chronic    Dementia [F03.90]  Yes     Chronic    Anemia of chronic disease [D63.8]  Yes     Chronic      Resolved Hospital Problems    Diagnosis Date Resolved POA   No resolved problems to display.       1) Found down - etiology ? - Nurse reports that she moves all ext spontaneously   2) Resp - was intubated for airway protection - her CXR does seem to have some perihilar infiltrates - DDX pulm edema vs aspiration - vent as above . Will wean as tolerated - on Zosyn - Hx antonella 10 days ago had a Bx at [facility] - was cancer . Will obtain CT of neck and chest - after CT then will try a CPAP trial   3) Poss pulm edema - Echo pending   4) ? Enlarged thyroid - poss adenopathy - will obtain a CT - will also needs records from [facility]

## 2017-03-12 NOTE — PLAN OF CARE
03/12/17 1233   Discharge Assessment   Assessment Type Discharge Planning Assessment   Confirmed/corrected address and phone number on facesheet? Yes   Assessment information obtained from? Other  (Pt unavailable to provide information. SW called pt's son Ariana Cramer  @ [phone] to complete assessment)   Expected Length of Stay (days) 4   Communicated expected length of stay with patient/caregiver yes   Type of Healthcare Directive Received Durable power of  for health care;Advance Directive  (Pt's son, Iman Camara, stated that he and brother, Manuel Camara have POA and LW was com pleted)   If Healthcare Directive is received, is it scanned into Epic? no (comment)  (Requested that copy of Advance Directive be provided to hospital)   Prior to hospitilization cognitive status: Unable to Assess   Prior to hospitalization functional status: Needs Assistance   Current cognitive status: Unable to Assess   Current Functional Status: Needs Assistance   Arrived From nursing home   Lives With facility resident   Able to Return to Prior Arrangements yes   Is patient able to care for self after discharge? No   How many people do you have in your home that can help with your care after discharge? (Facility staff)   Readmission Within The Last 30 Days unable to assess   Patient currently being followed by outpatient case management? No   Patient currently receives home health services? No   Does the patient currently use HME? No   Patient currently receives private duty nursing? No   Patient currently receives any other outside agency services? No   Equipment Currently Used at Home wheelchair   Do you have any problems affording any of your prescribed medications? No   Is the patient taking medications as prescribed? yes   Do you have any financial concerns preventing you from receiving the healthcare you need? No   Does the patient have transportation to healthcare appointments? Yes   Transportation  Available agency transportation   On Dialysis? No   Does the patient receive services at the Coumadin Clinic? No   Are there any open cases? No   Discharge Plan B Return to Nursing Home   Patient is from [facility]. She has been there for 2 years. Discharge plan: to return

## 2017-03-12 NOTE — ASSESSMENT & PLAN NOTE
Her urinalysis is significant for a specific gravity of 1.010, 1+ protein, +3 ketones, and many bacteria.  Urine and blood cultures are pending.  GNR on IV Abx.

## 2017-03-12 NOTE — SUBJECTIVE & OBJECTIVE
Past Medical History:   Diagnosis Date    Cancer     Dementia     Diabetes mellitus     Hypertension     Thyroid disease        History reviewed. No pertinent surgical history.    Review of patient's allergies indicates:  No Known Allergies    No current facility-administered medications on file prior to encounter.      Current Outpatient Prescriptions on File Prior to Encounter   Medication Sig    aspirin (ECOTRIN) 81 MG EC tablet Take 81 mg by mouth once daily.    donepezil (ARICEPT) 10 MG tablet Take 10 mg by mouth every evening.    lisinopril (PRINIVIL,ZESTRIL) 20 MG tablet Take 20 mg by mouth once daily.    metformin (GLUCOPHAGE) 500 MG tablet Take 500 mg by mouth 2 (two) times daily with meals.    mirtazapine (REMERON) 15 MG tablet Take 15 mg by mouth every evening.     Family History     None        Social History Main Topics    Smoking status: Never Smoker    Smokeless tobacco: Not on file    Alcohol use No    Drug use: No    Sexual activity: Not on file     Review of Systems   Unable to perform ROS: Intubated     Objective:     Vital Signs (Most Recent):  Temp: 99 °F (37.2 °C) (03/12/17 0700)  Pulse: 94 (03/12/17 0903)  Resp: (!) 31 (03/12/17 0903)  BP: 113/63 (03/12/17 0903)  SpO2: (!) 86 % (03/12/17 0903) Vital Signs (24h Range):  Temp:  [98.2 °F (36.8 °C)-99.2 °F (37.3 °C)] 99 °F (37.2 °C)  Pulse:  [] 94  Resp:  [11-31] 31  SpO2:  [34 %-100 %] 86 %  BP: ()/(48-80) 113/63     Weight: 49.5 kg (109 lb 2 oz)  Body mass index is 19.96 kg/(m^2).    Physical Exam   Constitutional: She appears well-developed and well-nourished. No distress.   HENT:   Head: Normocephalic and atraumatic.   Right Ear: External ear normal.   Left Ear: External ear normal.   Nose: Nose normal.   OGT and ETT in place with some blood present in mouth   Eyes: Right eye exhibits no discharge. Left eye exhibits no discharge.   Cardiovascular: Normal rate, regular rhythm, normal heart sounds and intact distal  pulses.  Exam reveals no friction rub.    No murmur heard.  Pulmonary/Chest:   Mechanically ventilated with coarse rhonchi.  No murmurs or rubs.   Abdominal: Soft. Bowel sounds are normal. She exhibits no distension. There is no tenderness. There is no rebound and no guarding.   Musculoskeletal: Normal range of motion. She exhibits no edema.   Skin: Skin is warm and dry. She is not diaphoretic. There is erythema.   Psychiatric: She has a normal mood and affect. Her behavior is normal. Judgment and thought content normal.   Nursing note and vitals reviewed.       Significant Labs: All pertinent labs within the past 24 hours have been reviewed.    Significant Imaging: I have reviewed and interpreted all pertinent imaging results/findings within the past 24 hours.

## 2017-03-12 NOTE — ASSESSMENT & PLAN NOTE
The etiology of her respiratory failure may duo to aspiration event..  She was intubated for airway protection and for hypoxia as she was with agonal respirations.  A post-intubation ABG was obtained to and was revealing for 7.207  51.1  123  20.3 on a ventilator setting of AC 14  400  +5  60%. Pulmonary doing ventilator management.son mention patient has vita diagnosed with cancer in neck after biopsy,does not know what kind,will do CT chest and neck,has goiter in neck.

## 2017-03-12 NOTE — PROGRESS NOTES
CPAP trial attempted. Ventilator going into back up ventilation. Placed back on rate. RN/Sarai @ bedside.

## 2017-03-13 PROBLEM — J15.211 PNEUMONIA DUE TO METHICILLIN SUSCEPTIBLE STAPHYLOCOCCUS AUREUS: Status: ACTIVE | Noted: 2017-03-13

## 2017-03-13 PROBLEM — C76.0 NECK MALIGNANT NEOPLASM: Status: ACTIVE | Noted: 2017-03-13

## 2017-03-13 LAB
ALLENS TEST: ABNORMAL
ANION GAP SERPL CALC-SCNC: 7 MMOL/L
BACTERIA SPEC AEROBE CULT: NORMAL
BACTERIA SPEC AEROBE CULT: NORMAL
BASOPHILS # BLD AUTO: 0.03 K/UL
BASOPHILS NFR BLD: 0.4 %
BUN SERPL-MCNC: 7 MG/DL
CALCIUM SERPL-MCNC: 8 MG/DL
CHLORIDE SERPL-SCNC: 106 MMOL/L
CO2 SERPL-SCNC: 21 MMOL/L
CREAT SERPL-MCNC: 0.8 MG/DL
DELSYS: ABNORMAL
DIFFERENTIAL METHOD: ABNORMAL
EOSINOPHIL # BLD AUTO: 0.4 K/UL
EOSINOPHIL NFR BLD: 6.2 %
ERYTHROCYTE [DISTWIDTH] IN BLOOD BY AUTOMATED COUNT: 15.5 %
ERYTHROCYTE [SEDIMENTATION RATE] IN BLOOD BY WESTERGREN METHOD: 12 MM/H
EST. GFR  (AFRICAN AMERICAN): >60 ML/MIN/1.73 M^2
EST. GFR  (NON AFRICAN AMERICAN): >60 ML/MIN/1.73 M^2
FIO2: 0.35
GLUCOSE SERPL-MCNC: 225 MG/DL
GRAM STN SPEC: NORMAL
GRAM STN SPEC: NORMAL
HCO3 UR-SCNC: 22.2 MMOL/L (ref 24–28)
HCT VFR BLD AUTO: 22.4 %
HGB BLD-MCNC: 7.2 G/DL
LYMPHOCYTES # BLD AUTO: 1.1 K/UL
LYMPHOCYTES NFR BLD: 15.4 %
MCH RBC QN AUTO: 25.4 PG
MCHC RBC AUTO-ENTMCNC: 32.1 %
MCV RBC AUTO: 79 FL
MODE: ABNORMAL
MONOCYTES # BLD AUTO: 0.5 K/UL
MONOCYTES NFR BLD: 7.3 %
NEUTROPHILS # BLD AUTO: 5 K/UL
NEUTROPHILS NFR BLD: 70.7 %
PCO2 BLDA: 36.6 MMHG (ref 35–45)
PEEP: 5
PH SMN: 7.39 [PH] (ref 7.35–7.45)
PLATELET # BLD AUTO: 225 K/UL
PMV BLD AUTO: 10.1 FL
PO2 BLDA: 118 MMHG (ref 80–100)
POC BE: -2 MMOL/L
POC SATURATED O2: 99 % (ref 95–100)
POC TCO2: 23 MMOL/L (ref 23–27)
POCT GLUCOSE: 237 MG/DL (ref 70–110)
POCT GLUCOSE: 256 MG/DL (ref 70–110)
POCT GLUCOSE: 258 MG/DL (ref 70–110)
POTASSIUM SERPL-SCNC: 3.3 MMOL/L
PS: 10
RBC # BLD AUTO: 2.83 M/UL
SAMPLE: ABNORMAL
SITE: ABNORMAL
SODIUM SERPL-SCNC: 134 MMOL/L
SP02: 100
VANCOMYCIN SERPL-MCNC: 7 UG/ML
VT: 400
WBC # BLD AUTO: 7.12 K/UL

## 2017-03-13 PROCEDURE — 31720 CLEARANCE OF AIRWAYS: CPT

## 2017-03-13 PROCEDURE — 85025 COMPLETE CBC W/AUTO DIFF WBC: CPT

## 2017-03-13 PROCEDURE — 27000221 HC OXYGEN, UP TO 24 HOURS

## 2017-03-13 PROCEDURE — 94640 AIRWAY INHALATION TREATMENT: CPT

## 2017-03-13 PROCEDURE — 25000003 PHARM REV CODE 250: Performed by: EMERGENCY MEDICINE

## 2017-03-13 PROCEDURE — C9113 INJ PANTOPRAZOLE SODIUM, VIA: HCPCS | Performed by: EMERGENCY MEDICINE

## 2017-03-13 PROCEDURE — 63600175 PHARM REV CODE 636 W HCPCS: Performed by: HOSPITALIST

## 2017-03-13 PROCEDURE — 63600175 PHARM REV CODE 636 W HCPCS: Performed by: INTERNAL MEDICINE

## 2017-03-13 PROCEDURE — 63600175 PHARM REV CODE 636 W HCPCS: Performed by: EMERGENCY MEDICINE

## 2017-03-13 PROCEDURE — C9113 INJ PANTOPRAZOLE SODIUM, VIA: HCPCS | Performed by: HOSPITALIST

## 2017-03-13 PROCEDURE — 99900026 HC AIRWAY MAINTENANCE (STAT)

## 2017-03-13 PROCEDURE — 25000242 PHARM REV CODE 250 ALT 637 W/ HCPCS: Performed by: INTERNAL MEDICINE

## 2017-03-13 PROCEDURE — 36415 COLL VENOUS BLD VENIPUNCTURE: CPT

## 2017-03-13 PROCEDURE — 94003 VENT MGMT INPAT SUBQ DAY: CPT

## 2017-03-13 PROCEDURE — 25000003 PHARM REV CODE 250: Performed by: INTERNAL MEDICINE

## 2017-03-13 PROCEDURE — 80202 ASSAY OF VANCOMYCIN: CPT

## 2017-03-13 PROCEDURE — 20000000 HC ICU ROOM

## 2017-03-13 PROCEDURE — 80048 BASIC METABOLIC PNL TOTAL CA: CPT

## 2017-03-13 PROCEDURE — 94761 N-INVAS EAR/PLS OXIMETRY MLT: CPT

## 2017-03-13 RX ORDER — IPRATROPIUM BROMIDE AND ALBUTEROL SULFATE 2.5; .5 MG/3ML; MG/3ML
3 SOLUTION RESPIRATORY (INHALATION) EVERY 6 HOURS
Status: DISCONTINUED | OUTPATIENT
Start: 2017-03-13 | End: 2017-03-15

## 2017-03-13 RX ORDER — FUROSEMIDE 10 MG/ML
20 INJECTION INTRAMUSCULAR; INTRAVENOUS ONCE
Status: COMPLETED | OUTPATIENT
Start: 2017-03-13 | End: 2017-03-13

## 2017-03-13 RX ORDER — PANTOPRAZOLE SODIUM 40 MG/10ML
40 INJECTION, POWDER, LYOPHILIZED, FOR SOLUTION INTRAVENOUS DAILY
Status: DISCONTINUED | OUTPATIENT
Start: 2017-03-13 | End: 2017-03-17

## 2017-03-13 RX ADMIN — IPRATROPIUM BROMIDE AND ALBUTEROL SULFATE 3 ML: .5; 3 SOLUTION RESPIRATORY (INHALATION) at 07:03

## 2017-03-13 RX ADMIN — PROPOFOL 15 MCG/KG/MIN: 10 INJECTION, EMULSION INTRAVENOUS at 01:03

## 2017-03-13 RX ADMIN — CHLORHEXIDINE GLUCONATE 15 ML: 1.2 RINSE ORAL at 10:03

## 2017-03-13 RX ADMIN — PROPOFOL 29.7 MCG/KG/MIN: 10 INJECTION, EMULSION INTRAVENOUS at 03:03

## 2017-03-13 RX ADMIN — PANTOPRAZOLE SODIUM 40 MG: 40 INJECTION, POWDER, FOR SOLUTION INTRAVENOUS at 12:03

## 2017-03-13 RX ADMIN — INSULIN ASPART 3 UNITS: 100 INJECTION, SOLUTION INTRAVENOUS; SUBCUTANEOUS at 05:03

## 2017-03-13 RX ADMIN — PIPERACILLIN AND TAZOBACTAM 4.5 G: 4; .5 INJECTION, POWDER, FOR SOLUTION INTRAVENOUS at 06:03

## 2017-03-13 RX ADMIN — FENTANYL CITRATE 25 MCG/HR: 50 INJECTION, SOLUTION INTRAMUSCULAR; INTRAVENOUS at 07:03

## 2017-03-13 RX ADMIN — DEXTROSE 8 MG/HR: 50 INJECTION, SOLUTION INTRAVENOUS at 05:03

## 2017-03-13 RX ADMIN — INSULIN ASPART 2 UNITS: 100 INJECTION, SOLUTION INTRAVENOUS; SUBCUTANEOUS at 05:03

## 2017-03-13 RX ADMIN — FUROSEMIDE 20 MG: 10 INJECTION, SOLUTION INTRAMUSCULAR; INTRAVENOUS at 12:03

## 2017-03-13 RX ADMIN — VANCOMYCIN HYDROCHLORIDE 750 MG: 750 INJECTION, POWDER, LYOPHILIZED, FOR SOLUTION INTRAVENOUS at 05:03

## 2017-03-13 RX ADMIN — INSULIN ASPART 3 UNITS: 100 INJECTION, SOLUTION INTRAVENOUS; SUBCUTANEOUS at 12:03

## 2017-03-13 RX ADMIN — ENOXAPARIN SODIUM 40 MG: 100 INJECTION SUBCUTANEOUS at 12:03

## 2017-03-13 RX ADMIN — DEXTROSE 8 MG/HR: 50 INJECTION, SOLUTION INTRAVENOUS at 10:03

## 2017-03-13 RX ADMIN — INSULIN ASPART 1 UNITS: 100 INJECTION, SOLUTION INTRAVENOUS; SUBCUTANEOUS at 11:03

## 2017-03-13 RX ADMIN — DEXTROSE 8 MG/HR: 50 INJECTION, SOLUTION INTRAVENOUS at 12:03

## 2017-03-13 RX ADMIN — ASPIRIN 81 MG: 81 TABLET, COATED ORAL at 09:03

## 2017-03-13 RX ADMIN — PIPERACILLIN AND TAZOBACTAM 4.5 G: 4; .5 INJECTION, POWDER, FOR SOLUTION INTRAVENOUS at 01:03

## 2017-03-13 RX ADMIN — IPRATROPIUM BROMIDE AND ALBUTEROL SULFATE 3 ML: .5; 3 SOLUTION RESPIRATORY (INHALATION) at 01:03

## 2017-03-13 RX ADMIN — CHLORHEXIDINE GLUCONATE 15 ML: 1.2 RINSE ORAL at 09:03

## 2017-03-13 RX ADMIN — PIPERACILLIN AND TAZOBACTAM 4.5 G: 4; .5 INJECTION, POWDER, FOR SOLUTION INTRAVENOUS at 09:03

## 2017-03-13 RX ADMIN — DONEPEZIL HYDROCHLORIDE 10 MG: 10 TABLET, FILM COATED ORAL at 09:03

## 2017-03-13 NOTE — PLAN OF CARE
Problem: Patient Care Overview  Goal: Plan of Care Review  03/13/2017     Recommendations     Recommendation/Intervention: 1) TF recs:  Isosource 1.5 @ 25 cc/hr + 4packets of Beneprotein, provides 1000 calories (1231 with propofol & D5W), 100.8 g carbohydrates, 65 g protein, 458 cc free water 2) Flushes per MD 3) Check residuals Q4 hours. Hold if > 250 cc 4) When medically feasible, advance patient to a Diabetic 1800 calorie, Low Na, 2g diet  Goals: 1) Patient will meet 95 - 110% of EEN & EPN within 72 hours 2) Patient will tolerate feedings  Nutrition Goal Status: new  Communication of RD Recs: other (comment) (physician's sticky note)     Calista Garcia, MPH, RD, LDN

## 2017-03-13 NOTE — PLAN OF CARE
Problem: Patient Care Overview  Goal: Plan of Care Review  Outcome: Ongoing (interventions implemented as appropriate)  VSS. Remains intubated in ICU. Propofol and fentanyl for sedation. protonix gtt continues. Wakes up easily but still not following commands. Remains free from fall, injury, or breakdown throughout shift.

## 2017-03-13 NOTE — PROGRESS NOTES
[facility] [place]  Progress Note    SUMMARY     Recommendations    Recommendation/Intervention: 1) TF recs:  Isosource 1.5 @ 25 cc/hr + 4packets of Beneprotein, provides 1000 calories (1231 with propofol & D5W), 100.8 g carbohydrates, 65 g protein, 458 cc free water 2) Flushes per MD 3) Check residuals Q4 hours. Hold if > 250 cc 4) When medically feasible, advance patient to a Diabetic 1800 calorie, Low Na, 2g diet  Goals: 1) Patient will meet 95 - 110% of EEN & EPN within 72 hours 2) Patient will tolerate feedings  Nutrition Goal Status: new  Communication of RD Recs: other (comment) (physician's sticky note)    Continuum of Care Plan    Referral to Outpatient Services:  (discharge plan to be determined)    Reason for Assessment    Reason for Assessment: RD follow-up  Relevent Medical History: cancer, dementia, DM, HTN   General Information Comments: Patient intubated and sedated. TF running at goal rate and exceeding estimated energy needs (128.6%) + propofol + D5W    Nutrition Prescription Ordered    Current Diet Order: NPO     Current Nutrition Support Formula Ordered:  (Diabetasource)  Current Nutrition Support Rate Ordered: 45 (ml) (mls/hr)  Current Nutrition Support Frequency Ordered: continuous    Evaluation of Received Nutrients/Fluid Intake    Enteral Calories (kcal): 1296  Enteral Protein (gm): 65  Enteral (Free Water) Fluid (mL): 883  Free Water Flush Fluid (mL): 1000    Other Calories (kcal): 231 (propofol + D5W)  Total Calories (kcal): 1527     Energy Calories Required: exceed needs  % Kcal Needs: 128.6     Protein Required: meeting needs  % Protein Needs: 100     Other Fluid (mL): 120   Fluid Required: exceed needs (Net I/O +929)    Tolerance: tolerating     Nutrition Risk Screen     Nutrition Risk Screen: no indicators present    Nutrition/Diet History    Patient Reported Diet/Restrictions/Preferences:  (GABBY)     Food Preferences: GABBY    Factors Affecting Nutritional  Intake: NPO, on mechanical ventilation       Labs/Tests/Procedures/Meds    Diagnostic Test/Procedure Review: reviewed, pertinent  Pertinent Labs Reviewed: reviewed  Pertinent Labs Comments: , , Troponin 0.174  BMP  Lab Results   Component Value Date     (L) 03/13/2017    K 3.3 (L) 03/13/2017     03/13/2017    CO2 21 (L) 03/13/2017    BUN 7 (L) 03/13/2017    CREATININE 0.8 03/13/2017    CALCIUM 8.0 (L) 03/13/2017    ANIONGAP 7 (L) 03/13/2017    ESTGFRAFRICA >60 03/13/2017    EGFRNONAA >60 03/13/2017     Lab Results   Component Value Date    CALCIUM 8.0 (L) 03/13/2017    PHOS 2.1 (L) 03/12/2017       Lab Results   Component Value Date    ALBUMIN 3.3 (L) 03/11/2017       Pertinent Medications Reviewed: reviewed, pertinent  Scheduled Meds:   aspirin  81 mg Oral Daily    chlorhexidine  15 mL Mouth/Throat BID    donepezil  10 mg Oral QHS    enoxaparin  40 mg Subcutaneous Daily    pantoprazole  40 mg Intravenous Daily    piperacillin-tazobactam 4.5 g in dextrose 5 % 100 mL IVPB (ready to mix system)  4.5 g Intravenous Q8H    vancomycin (VANCOCIN) IVPB  750 mg Intravenous Q24H     Continuous Infusions:   fentanyl 5 mL/hr at 03/13/17 1200    propofol 29.696 mcg/kg/min (03/13/17 1200)     PRN Meds:.acetaminophen, dextrose 50%, dextrose 50%, glucagon (human recombinant), glucose, glucose, hydrALAZINE, insulin aspart, ondansetron, pneumoc 13-emma conj-dip cr(PF), promethazine (PHENERGAN) IVPB, ramelteon      Physical Findings    Overall Physical Appearance: on ventilator support, underweight  Tubes: orogastric tube  Oral/Mouth Cavity: bleeding, tooth/teeth missing  Skin: intact    Anthropometrics    Height Method: Stated  Height (inches): 62.01 in  Weight Method: Bed Scale  Weight (kg): 50.8 kg  Ideal Body Weight (IBW), Female: 110.05 lb  % Ideal Body Weight, Female (lb): 101.76 lb  BMI (kg/m2): 20.48  BMI Grade: 18.5-24.9 - normal      Estimated/Assessed Needs    Weight Used For Calorie  Calculations: 50.8 kg (111 lb 15.9 oz)   Height (cm): 157.5 cm  Energy Need Method: [place] (1187)  RMR ([place]-St. Jeor Equation): 937.27  Weight Used For Protein Calculations: 50.8 kg (111 lb 15.9 oz)  Protein Requirements: 61 - 101.6  Fluid Need Method: RDA Method, other (see comments) (or per MD)  CHO requirement = 222 g/day     Nutrition Diagnosis  Nutrition Problem: Excessive enteral nutrition infusion  Etiology:  Formula/rate  Signs/Symptoms:  Patient meeting 128.6% of estimated energy needs  Status:  new    Monitor and Evaluation    Food and Nutrient Intake: energy intake, food and beverage intake, enteral nutrition intake  Food and Nutrient Adminstration: diet order, enteral and parenteral nutrition administration  Knowledge/Beliefs/Attitudes: beliefs and attitudes  Anthropometric Measurements: weight, weight change  Biochemical Data, Medical Tests and Procedures: electrolyte and renal panel, gastrointestinal profile, glucose/endocrine profile, lipid profile  Nutrition-Focused Physical Findings: overall appearance    Nutrition Risk    Level of Risk: moderate (1x/week)    Nutrition Follow-Up    RD Follow-up?: Yes    Assessment and Plan    No new Assessment & Plan notes have been filed under this hospital service since the last note was generated.  Service: Nutrition

## 2017-03-13 NOTE — PHYSICIAN QUERY
"PT Name: Teodoro Whitehead  MR #: [id number]    Physician Query Form -Systemic Infectious Process Clarification   Reviewer ;  [email or web address]    This form is a permanent document in the medical record.     Query Date: March 13, 2017   By submitting this query, we are merely seeking further clarification of documentation. Please utilize your independent clinical judgment when addressing the question(s) below.     Indicators     Supporting Clinical Findings Location in Medical Record    Positive Culture     x Lactic Acid 2.8 Labs 3/10    WBC=             CRP=  Bands=           Granulocytes=             Lymphocytes=  Monocytes=     x HR=          RR=            BP=             Temp = HR=  100  T=  100.4  RR=  37  SpO2= % PN  3/13      "      "       "   x "Hypoxia" documented · Acute respiratory failure with hypoxia H&P  3/11    "Confusion/Altered Mental Status" documented      "Urosepsis" documented*       x Medication/Treatment: Piperacillin-tazobactam,IV  Vancomycin, IV   MAR    "Bacteremia" documented     x Documented or Suspected source of infection MRSA on respiratory culture Micro 3/11    "Sepsis" documented      (Failed) Outpatient Treatment     x Other:  EMS reports pt was found  unresponsive at 9:15 pm today. EMS also reports pt had shallow breathing.   ED  prov 3/10   *The Official Guidelines for Coding and Reporting, Section 1, states "the term Urosepsis is a non specific term. If it is the only term documented, then only code 599.0 (UTI) should be assigned." Coding Clinic 2nd Quarter 2004    Provider, please specify the diagnosis or diagnoses associated with above clinical findings.    [    ] Sepsis = Infection-induced syndrome without organ dysfunction.  [    ] Severe Sepsis = Sepsis with associated acute organ dysfunction (Specify organ dysfunction)____________________________                    [    x] Septic Shock = Severe sepsis in which the cardiovascular system begins to fail " resulting in reduced organ perfusion  [    ] Sepsis Present on Admission, Now Resolved  [    ] Sepsis Not Present on Admission, Now Resolved  [    ] Sepsis Ruled out  [    ] Other Infectious Disease (Specify)__________________________________________  [    ] Clinically undetermined

## 2017-03-13 NOTE — PROGRESS NOTES
Progress Note  Pulmonology    Admit Date: 3/10/2017   LOS: 2 days     SUBJECTIVE:     Follow-up For: Acute Resp failure   No change - Nurse reports that she moves all ext - purposeful movement . No commands. Slightly opens eyes to voice.     Continuous Infusions:   fentanyl 5 mL/hr at 03/13/17 0900    propofol 29.696 mcg/kg/min (03/13/17 0900)     Scheduled Meds:   aspirin  81 mg Oral Daily    chlorhexidine  15 mL Mouth/Throat BID    donepezil  10 mg Oral QHS    enoxaparin  40 mg Subcutaneous Daily    pantoprazole  40 mg Intravenous Daily    piperacillin-tazobactam 4.5 g in dextrose 5 % 100 mL IVPB (ready to mix system)  4.5 g Intravenous Q8H    vancomycin (VANCOCIN) IVPB  750 mg Intravenous Q24H     Review of Systems:  Review of systems not obtained due to patient factors Intubated .    OBJECTIVE:     Vital Signs Range (Last 24H):  Temp:  [98 °F (36.7 °C)-100.4 °F (38 °C)]   Pulse:  []   Resp:  [4-37]   BP: ()/(41-66)   SpO2:  [56 %-100 %]     I & O (Last 24H):    Intake/Output Summary (Last 24 hours) at 03/13/17 1238  Last data filed at 03/13/17 0948   Gross per 24 hour   Intake          2699.39 ml   Output             1660 ml   Net          1039.39 ml     Physical Exam:  General: no distress. Emaciated.  Neck: bilobed LAD left cervical regions. No bruits.  Lungs: 7.5 ett. Decreased shallow bs with rhonchi bilaterally. No wheezing.  Heart: regular rate and rhythm and no murmur  Abdomen: soft, non-tender non-distended; decreased bowel. OGT.  Extremities: no cyanosis or edema, or clubbing. SCDs to LE  Neurologic: Sedated     Laboratory:  CBC:     Recent Labs  Lab 03/13/17 0139   WBC 7.12   RBC 2.83*   HGB 7.2*   HCT 22.4*      MCV 79*   MCH 25.4*   MCHC 32.1     BMP:     Recent Labs  Lab 03/12/17  0148 03/13/17 0139   * 225*    134*   K 3.7 3.3*    106   CO2 20* 21*   BUN 9 7*   CREATININE 0.8 0.8   CALCIUM 8.4* 8.0*   MG 1.5*  --      CMP:   Recent Labs  Lab  03/11/17  0255  03/13/17  0139   *  < > 225*   CALCIUM 9.2  < > 8.0*   ALBUMIN 3.3*  --   --    PROT 7.9  --   --      < > 134*   K 3.8  < > 3.3*   CO2 17*  < > 21*     < > 106   BUN 17  < > 7*   CREATININE 0.9  < > 0.8   ALKPHOS 59  --   --    ALT 14  --   --    AST 26  --   --    BILITOT 0.6  --   --    < > = values in this interval not displayed.     Vent Mode: SIMV (PRVC) + PS  Oxygen Concentration (%):  [35] 35  Resp Rate Total:  [11 br/min-27 br/min] 13 br/min  Vt Set:  [400 mL] 400 mL  PEEP/CPAP:  [5 cmH20] 5 cmH20  Pressure Support:  [10 cmH20] 10 cmH20  Mean Airway Pressure:  [6.7 xgB13-70.6 cmH20] 19.3 cmH20    ABGs:     Recent Labs  Lab 03/13/17  0448   PH 7.391   PCO2 36.6   PO2 118*   HCO3 22.2*   POCSATURATED 99   BE -2     Chest X-Ray: I personally reviewed the films and findings are: No change     ASSESSMENT/PLAN:     Active Hospital Problems    Diagnosis  POA    *Acute respiratory failure with hypoxia [J96.01]  Yes    Neck malignant neoplasm [C76.0]  Yes    Pneumonia due to methicillin susceptible Staphylococcus aureus [J15.211]  Yes    On mechanically assisted ventilation [Z99.11]  Not Applicable    UTI (urinary tract infection) [N39.0]  Yes    Essential hypertension [I10]  Yes     Chronic    Type 2 diabetes mellitus [E11.9]  Yes     Chronic    Dementia without behavioral disturbance [F03.90]  Yes    Anemia of chronic disease [D63.8]  Yes     Chronic      Resolved Hospital Problems    Diagnosis Date Resolved POA   No resolved problems to display.     Echo    1 - Moderately depressed left ventricular systolic function (EF 35-40%).     2 - Eccentric hypertrophy.     3 - Left ventricular diastolic dysfunction.     4 - Mild to moderate mitral regurgitation.     5 - Mild to moderate tricuspid regurgitation.    CT chest/neck:  #1. Marked prominence of the subcutaneous soft tissues of the anterior/inferior neck and superior/anterior chest.  The pattern of opacification is  nonspecific but may represent a diffusely infiltrating process, possibly a malignancy.  This process abuts the thyroid which contains large nodules.,  Lymphoma or a diffusely infiltrating primary thyroid malignancy are within the differential.  There is also bulky superior mediastinal as well as left neck lymphadenopathy.  A dedicated thyroid ultrasound is recommended.  #2.  Patchy bilateral lung consolidation and ground glass opacification that may reflect infection but is nonspecific.  Additionally, there is dependent consolidation of the bilateral lungs that may reflect aspiration or infection.  There are also a couple of nonspecific lung nodules.  Malignancy/metastatic disease is not excluded.  #3.  Scattered lytic lesions within the cervical spine are nonspecific but could represent metastatic disease.    #4.  Cardiomegaly.  #5.  Coronary atherosclerosis.    #6.  Mild to moderate partially visualized intrahepatic bile duct dilatation.    Impression/recs:   1) Found down - etiology ? - Nurse reports that she moves all ext spontaneously but very weak and not very responsive.    2) Acute Resp failure - was intubated for airway protection. DDX pulm edema vs aspiration - vent as above. Weak. LAD. Will wean as tolerated. On empiric abx. Continue weaning efforts. Bronchodilators. Appears to have metastatic process of some sort. CT chest and neck as above. Will need records from [facility].  3) Poss pulm edema - Echo as above. Positive balance. Check bnp in am.    4) ? Enlarged thyroid - See above. Suspect poor prognosis. Will need palliative care if not following already.    Arin Burks

## 2017-03-13 NOTE — ASSESSMENT & PLAN NOTE
Per family had biopsy in [facility] by oncology about 10 days ago.showed malignancy,they do no know,what kind it is,they did not recommended therapy,has follow up appointment.CT neck show possible lymphoma,wirh met's,possible contributing factor for respiratory failure and aspiration,asked son for DNR status,he will talk with his brother and let us know,he is open for DNR status.

## 2017-03-13 NOTE — PROGRESS NOTES
[facility] Hospital Medicine  Progress Note    Patient Name: Teodoro Whitehead  MRN: [id number]  Patient Class: IP- Inpatient   Admission Date: 3/10/2017  Length of Stay: 2 days  Attending Physician: Valentina Ferris MD  Primary Care Provider: Primary Doctor No        Subjective:     Principal Problem:Acute respiratory failure with hypoxia    HPI:  Mrs. Teodoro Whitehead is a 82 y.o. female [facility] with essential hypertension, type 2 diabetes mellitus (HbA1c unknown), anemia of chronic disease, and dementia who presents to [facility] ED with complaints of unresponsiveness tonight.  She was found in this state at around 9:15 PM tonight.  She was reportedly shallow breathing.  EMS was activated and noted that her oxygen saturation on ambient air was 70% and that her heart rate was in the 140s.  Further history is limited at this time.    Hospital Course:  Mrs. Teodoro Whitehead is a 82 y.o. female [facility] resident with essential hypertension, type 2 diabetes mellitus (HbA1c unknown), anemia of chronic disease, and dementia who presents to [facility] ED with complaints of unresponsiveness  She was reportedly shallow breathing.  EMS was activated and noted that her oxygen saturation on ambient air was 70% and that her heart rate was in the 140s.Patient has been intubated for acute hypoxic respiratory failure,pulmonology doing vent management.likely duo to aspiration,on IV Abx with Vanc for MRSA on sputum,.,also has UTI,Ecoli,on  urine culture.  Spoke with SON,he mention patient has been diagnosed with cancer in neck in [facility] just 10 days ago. CT chest and Neck show enlarge soft tissue swelling with lymphadenopathy in neck and metasatatic bone leseion in cervical area, and possibly in lung, has goiter in neck.but per son,it is chronic,likely is lymphoma,oncology in [facility] not recommending any therapy,has follow up plan as out patient.on SIMV at this time and Pulmonology is  following.      Past Medical History:   Diagnosis Date    Cancer     Dementia     Diabetes mellitus     Hypertension     Thyroid disease        History reviewed. No pertinent surgical history.    Review of patient's allergies indicates:  No Known Allergies    No current facility-administered medications on file prior to encounter.      Current Outpatient Prescriptions on File Prior to Encounter   Medication Sig    aspirin (ECOTRIN) 81 MG EC tablet Take 81 mg by mouth once daily.    donepezil (ARICEPT) 10 MG tablet Take 10 mg by mouth every evening.    lisinopril (PRINIVIL,ZESTRIL) 20 MG tablet Take 20 mg by mouth once daily.    metformin (GLUCOPHAGE) 500 MG tablet Take 500 mg by mouth 2 (two) times daily with meals.    mirtazapine (REMERON) 15 MG tablet Take 15 mg by mouth every evening.     Family History     None        Social History Main Topics    Smoking status: Never Smoker    Smokeless tobacco: Not on file    Alcohol use No    Drug use: No    Sexual activity: Not on file     Review of Systems   Unable to perform ROS: Intubated     Objective:     Vital Signs (Most Recent):  Temp: 98.2 °F (36.8 °C) (03/13/17 0800)  Pulse: 68 (03/13/17 0953)  Resp: 14 (03/13/17 0953)  BP: (!) 92/44 (03/13/17 0932)  SpO2: 99 % (03/13/17 0953) Vital Signs (24h Range):  Temp:  [98 °F (36.7 °C)-100.4 °F (38 °C)] 98.2 °F (36.8 °C)  Pulse:  [] 68  Resp:  [4-37] 14  SpO2:  [56 %-100 %] 99 %  BP: ()/(41-66) 92/44     Weight: 50.8 kg (111 lb 15.9 oz)  Body mass index is 20.48 kg/(m^2).    Physical Exam   Constitutional: She appears well-developed and well-nourished. No distress.   HENT:   Head: Normocephalic and atraumatic.   Right Ear: External ear normal.   Left Ear: External ear normal.   Nose: Nose normal.   OGT and ETT in place with some blood present in mouth   Eyes: Right eye exhibits no discharge. Left eye exhibits no discharge.   Cardiovascular: Normal rate, regular rhythm, normal heart sounds and  intact distal pulses.  Exam reveals no friction rub.    No murmur heard.  Pulmonary/Chest:   Mechanically ventilated with coarse rhonchi.  No murmurs or rubs.   Abdominal: Soft. Bowel sounds are normal. She exhibits no distension. There is no tenderness. There is no rebound and no guarding.   Musculoskeletal: Normal range of motion. She exhibits no edema.   Skin: Skin is warm and dry. She is not diaphoretic. There is erythema.   Psychiatric: She has a normal mood and affect. Her behavior is normal. Judgment and thought content normal.   Nursing note and vitals reviewed.       Significant Labs: All pertinent labs within the past 24 hours have been reviewed.    Significant Imaging: I have reviewed and interpreted all pertinent imaging results/findings within the past 24 hours.    Assessment/Plan:      * Acute respiratory failure with hypoxia  The etiology of her respiratory failure may duo to aspiration event..  She was intubated for airway protection and for hypoxia as she was with agonal respirations.  A post-intubation ABG was obtained to and was revealing for 7.207  51.1  123  20.3 on a ventilator setting of AC 14  400  +5  60%. Pulmonary doing ventilator management.son mention patient has vita diagnosed with cancer in neck after biopsy,does not know what kind,CT chest and neck,show malignancy with mets.    On mechanically assisted ventilation  As addressed above.    UTI (urinary tract infection)  Ecoli  On Zosyn.    Essential hypertension  Patient's blood pressure is better-controlled; provide as-needed hydralazine.    Type 2 diabetes mellitus  She is on a home regimen of metformin but will start basal insulin therapy along with insulin sliding scale.    Dementia without behavioral disturbance  Will continue her home regimen of donepezil. Per family it is severe.    Anemia of chronic disease  The patient's H/H is dropped  with no sign of active bleeding,will continue monitor.    Neck malignant neoplasm  Per  family had biopsy in [facility] by oncology about 10 days ago.showed malignancy,they do no know,what kind it is,they did not recommended therapy,has follow up appointment.CT neck show possible lymphoma,wirh met's,possible contributing factor for respiratory failure and aspiration,asked son for DNR status,he will talk with his brother and let us know,he is open for DNR status.      Pneumonia due to methicillin susceptible Staphylococcus aureus  On sputum,started on Vanc.      VTE Risk Mitigation         Ordered     enoxaparin injection 40 mg  Daily     Route:  Subcutaneous        03/11/17 0345     Medium Risk of VTE  Once      03/11/17 0345          Valentina Ferris MD  Department of Hospital Medicine   [facility]

## 2017-03-13 NOTE — PLAN OF CARE
Problem: Patient Care Overview  Goal: Plan of Care Review  Outcome: Ongoing (interventions implemented as appropriate)  Remains on ventilator.  Propofol & fentanyl drips for comfort.  Lasix given. Diuresed well.  Son updated per Dr. Tovar at bedside.  Pressure ulcer & fall prevention interventions on-going.

## 2017-03-13 NOTE — SUBJECTIVE & OBJECTIVE
Past Medical History:   Diagnosis Date    Cancer     Dementia     Diabetes mellitus     Hypertension     Thyroid disease        History reviewed. No pertinent surgical history.    Review of patient's allergies indicates:  No Known Allergies    No current facility-administered medications on file prior to encounter.      Current Outpatient Prescriptions on File Prior to Encounter   Medication Sig    aspirin (ECOTRIN) 81 MG EC tablet Take 81 mg by mouth once daily.    donepezil (ARICEPT) 10 MG tablet Take 10 mg by mouth every evening.    lisinopril (PRINIVIL,ZESTRIL) 20 MG tablet Take 20 mg by mouth once daily.    metformin (GLUCOPHAGE) 500 MG tablet Take 500 mg by mouth 2 (two) times daily with meals.    mirtazapine (REMERON) 15 MG tablet Take 15 mg by mouth every evening.     Family History     None        Social History Main Topics    Smoking status: Never Smoker    Smokeless tobacco: Not on file    Alcohol use No    Drug use: No    Sexual activity: Not on file     Review of Systems   Unable to perform ROS: Intubated     Objective:     Vital Signs (Most Recent):  Temp: 98.2 °F (36.8 °C) (03/13/17 0800)  Pulse: 68 (03/13/17 0953)  Resp: 14 (03/13/17 0953)  BP: (!) 92/44 (03/13/17 0932)  SpO2: 99 % (03/13/17 0953) Vital Signs (24h Range):  Temp:  [98 °F (36.7 °C)-100.4 °F (38 °C)] 98.2 °F (36.8 °C)  Pulse:  [] 68  Resp:  [4-37] 14  SpO2:  [56 %-100 %] 99 %  BP: ()/(41-66) 92/44     Weight: 50.8 kg (111 lb 15.9 oz)  Body mass index is 20.48 kg/(m^2).    Physical Exam   Constitutional: She appears well-developed and well-nourished. No distress.   HENT:   Head: Normocephalic and atraumatic.   Right Ear: External ear normal.   Left Ear: External ear normal.   Nose: Nose normal.   OGT and ETT in place with some blood present in mouth   Eyes: Right eye exhibits no discharge. Left eye exhibits no discharge.   Cardiovascular: Normal rate, regular rhythm, normal heart sounds and intact distal  pulses.  Exam reveals no friction rub.    No murmur heard.  Pulmonary/Chest:   Mechanically ventilated with coarse rhonchi.  No murmurs or rubs.   Abdominal: Soft. Bowel sounds are normal. She exhibits no distension. There is no tenderness. There is no rebound and no guarding.   Musculoskeletal: Normal range of motion. She exhibits no edema.   Skin: Skin is warm and dry. She is not diaphoretic. There is erythema.   Psychiatric: She has a normal mood and affect. Her behavior is normal. Judgment and thought content normal.   Nursing note and vitals reviewed.       Significant Labs: All pertinent labs within the past 24 hours have been reviewed.    Significant Imaging: I have reviewed and interpreted all pertinent imaging results/findings within the past 24 hours.

## 2017-03-13 NOTE — ASSESSMENT & PLAN NOTE
The etiology of her respiratory failure may duo to aspiration event..  She was intubated for airway protection and for hypoxia as she was with agonal respirations.  A post-intubation ABG was obtained to and was revealing for 7.207  51.1  123  20.3 on a ventilator setting of AC 14  400  +5  60%. Pulmonary doing ventilator management.son mention patient has vita diagnosed with cancer in neck after biopsy,does not know what kind,CT chest and neck,show malignancy with mets.

## 2017-03-14 LAB
ALLENS TEST: ABNORMAL
ALLENS TEST: ABNORMAL
ANION GAP SERPL CALC-SCNC: 8 MMOL/L
BASOPHILS # BLD AUTO: 0.02 K/UL
BASOPHILS NFR BLD: 0.3 %
BNP SERPL-MCNC: 214 PG/ML
BUN SERPL-MCNC: 12 MG/DL
CALCIUM SERPL-MCNC: 8.4 MG/DL
CHLORIDE SERPL-SCNC: 103 MMOL/L
CO2 SERPL-SCNC: 24 MMOL/L
CREAT SERPL-MCNC: 0.9 MG/DL
DELSYS: ABNORMAL
DELSYS: ABNORMAL
DIFFERENTIAL METHOD: ABNORMAL
EOSINOPHIL # BLD AUTO: 0.4 K/UL
EOSINOPHIL NFR BLD: 5.7 %
ERYTHROCYTE [DISTWIDTH] IN BLOOD BY AUTOMATED COUNT: 15.6 %
EST. GFR  (AFRICAN AMERICAN): >60 ML/MIN/1.73 M^2
EST. GFR  (NON AFRICAN AMERICAN): >60 ML/MIN/1.73 M^2
FIO2: 30
FIO2: 30
GLUCOSE SERPL-MCNC: 239 MG/DL
HCO3 UR-SCNC: 24.8 MMOL/L (ref 24–28)
HCO3 UR-SCNC: 27.9 MMOL/L (ref 24–28)
HCT VFR BLD AUTO: 22.7 %
HGB BLD-MCNC: 7.4 G/DL
LYMPHOCYTES # BLD AUTO: 1.1 K/UL
LYMPHOCYTES NFR BLD: 18.2 %
MCH RBC QN AUTO: 25.4 PG
MCHC RBC AUTO-ENTMCNC: 32.6 %
MCV RBC AUTO: 78 FL
MODE: ABNORMAL
MODE: ABNORMAL
MONOCYTES # BLD AUTO: 0.5 K/UL
MONOCYTES NFR BLD: 8.3 %
NEUTROPHILS # BLD AUTO: 4.2 K/UL
NEUTROPHILS NFR BLD: 67.5 %
PCO2 BLDA: 32.5 MMHG (ref 35–45)
PCO2 BLDA: 36.3 MMHG (ref 35–45)
PEEP: 5
PEEP: 5
PH SMN: 7.44 [PH] (ref 7.35–7.45)
PH SMN: 7.54 [PH] (ref 7.35–7.45)
PLATELET # BLD AUTO: 214 K/UL
PMV BLD AUTO: 9.2 FL
PO2 BLDA: 107 MMHG (ref 80–100)
PO2 BLDA: 149 MMHG (ref 80–100)
POC BE: 1 MMOL/L
POC BE: 5 MMOL/L
POC SATURATED O2: 100 % (ref 95–100)
POC SATURATED O2: 98 % (ref 95–100)
POC TCO2: 26 MMOL/L (ref 23–27)
POC TCO2: 29 MMOL/L (ref 23–27)
POCT GLUCOSE: 207 MG/DL (ref 70–110)
POCT GLUCOSE: 247 MG/DL (ref 70–110)
POCT GLUCOSE: 258 MG/DL (ref 70–110)
POCT GLUCOSE: 283 MG/DL (ref 70–110)
POTASSIUM SERPL-SCNC: 3.1 MMOL/L
PS: 10
PS: 10
RBC # BLD AUTO: 2.91 M/UL
SAMPLE: ABNORMAL
SAMPLE: ABNORMAL
SITE: ABNORMAL
SITE: ABNORMAL
SODIUM SERPL-SCNC: 135 MMOL/L
SP02: 100
SPONT RATE: 17
WBC # BLD AUTO: 6.16 K/UL

## 2017-03-14 PROCEDURE — 94640 AIRWAY INHALATION TREATMENT: CPT

## 2017-03-14 PROCEDURE — 80048 BASIC METABOLIC PNL TOTAL CA: CPT

## 2017-03-14 PROCEDURE — 20000000 HC ICU ROOM

## 2017-03-14 PROCEDURE — C9113 INJ PANTOPRAZOLE SODIUM, VIA: HCPCS | Performed by: HOSPITALIST

## 2017-03-14 PROCEDURE — 25000242 PHARM REV CODE 250 ALT 637 W/ HCPCS: Performed by: INTERNAL MEDICINE

## 2017-03-14 PROCEDURE — 99900035 HC TECH TIME PER 15 MIN (STAT)

## 2017-03-14 PROCEDURE — 94761 N-INVAS EAR/PLS OXIMETRY MLT: CPT

## 2017-03-14 PROCEDURE — 36600 WITHDRAWAL OF ARTERIAL BLOOD: CPT

## 2017-03-14 PROCEDURE — 27200966 HC CLOSED SUCTION SYSTEM

## 2017-03-14 PROCEDURE — 94003 VENT MGMT INPAT SUBQ DAY: CPT

## 2017-03-14 PROCEDURE — 63600175 PHARM REV CODE 636 W HCPCS: Performed by: EMERGENCY MEDICINE

## 2017-03-14 PROCEDURE — 27000221 HC OXYGEN, UP TO 24 HOURS

## 2017-03-14 PROCEDURE — 63600175 PHARM REV CODE 636 W HCPCS: Performed by: HOSPITALIST

## 2017-03-14 PROCEDURE — 25000003 PHARM REV CODE 250: Performed by: EMERGENCY MEDICINE

## 2017-03-14 PROCEDURE — 82803 BLOOD GASES ANY COMBINATION: CPT

## 2017-03-14 PROCEDURE — 85025 COMPLETE CBC W/AUTO DIFF WBC: CPT

## 2017-03-14 PROCEDURE — 99900026 HC AIRWAY MAINTENANCE (STAT)

## 2017-03-14 PROCEDURE — 83880 ASSAY OF NATRIURETIC PEPTIDE: CPT

## 2017-03-14 PROCEDURE — 25000003 PHARM REV CODE 250: Performed by: HOSPITALIST

## 2017-03-14 PROCEDURE — 31720 CLEARANCE OF AIRWAYS: CPT

## 2017-03-14 PROCEDURE — 36415 COLL VENOUS BLD VENIPUNCTURE: CPT

## 2017-03-14 PROCEDURE — 63600175 PHARM REV CODE 636 W HCPCS: Performed by: INTERNAL MEDICINE

## 2017-03-14 PROCEDURE — 25000003 PHARM REV CODE 250: Performed by: INTERNAL MEDICINE

## 2017-03-14 RX ORDER — POTASSIUM CHLORIDE 20 MEQ/15ML
40 SOLUTION ORAL EVERY 4 HOURS
Status: COMPLETED | OUTPATIENT
Start: 2017-03-14 | End: 2017-03-14

## 2017-03-14 RX ADMIN — CHLORHEXIDINE GLUCONATE 15 ML: 1.2 RINSE ORAL at 09:03

## 2017-03-14 RX ADMIN — ENOXAPARIN SODIUM 40 MG: 100 INJECTION SUBCUTANEOUS at 12:03

## 2017-03-14 RX ADMIN — VANCOMYCIN HYDROCHLORIDE 750 MG: 750 INJECTION, POWDER, LYOPHILIZED, FOR SOLUTION INTRAVENOUS at 04:03

## 2017-03-14 RX ADMIN — IPRATROPIUM BROMIDE AND ALBUTEROL SULFATE 3 ML: .5; 3 SOLUTION RESPIRATORY (INHALATION) at 08:03

## 2017-03-14 RX ADMIN — INSULIN ASPART 3 UNITS: 100 INJECTION, SOLUTION INTRAVENOUS; SUBCUTANEOUS at 06:03

## 2017-03-14 RX ADMIN — POTASSIUM CHLORIDE 40 MEQ: 20 SOLUTION ORAL at 05:03

## 2017-03-14 RX ADMIN — IPRATROPIUM BROMIDE AND ALBUTEROL SULFATE 3 ML: .5; 3 SOLUTION RESPIRATORY (INHALATION) at 01:03

## 2017-03-14 RX ADMIN — POTASSIUM CHLORIDE 40 MEQ: 20 SOLUTION ORAL at 04:03

## 2017-03-14 RX ADMIN — PROPOFOL 30 MCG/KG/MIN: 10 INJECTION, EMULSION INTRAVENOUS at 02:03

## 2017-03-14 RX ADMIN — INSULIN ASPART 2 UNITS: 100 INJECTION, SOLUTION INTRAVENOUS; SUBCUTANEOUS at 12:03

## 2017-03-14 RX ADMIN — PANTOPRAZOLE SODIUM 40 MG: 40 INJECTION, POWDER, FOR SOLUTION INTRAVENOUS at 09:03

## 2017-03-14 RX ADMIN — PIPERACILLIN AND TAZOBACTAM 4.5 G: 4; .5 INJECTION, POWDER, FOR SOLUTION INTRAVENOUS at 10:03

## 2017-03-14 RX ADMIN — PIPERACILLIN AND TAZOBACTAM 4.5 G: 4; .5 INJECTION, POWDER, FOR SOLUTION INTRAVENOUS at 06:03

## 2017-03-14 RX ADMIN — INSULIN ASPART 2 UNITS: 100 INJECTION, SOLUTION INTRAVENOUS; SUBCUTANEOUS at 06:03

## 2017-03-14 RX ADMIN — PIPERACILLIN AND TAZOBACTAM 4.5 G: 4; .5 INJECTION, POWDER, FOR SOLUTION INTRAVENOUS at 02:03

## 2017-03-14 NOTE — SUBJECTIVE & OBJECTIVE
Interval History: Remains intubated.    Review of Systems   Unable to perform ROS: Intubated     Objective:     Vital Signs (Most Recent):  Temp: 98.1 °F (36.7 °C) (03/14/17 0800)  Pulse: 76 (03/14/17 0958)  Resp: (!) 9 (03/14/17 0958)  BP: 132/65 (03/14/17 0800)  SpO2: 100 % (03/14/17 0958) Vital Signs (24h Range):  Temp:  [98.1 °F (36.7 °C)-100.6 °F (38.1 °C)] 98.1 °F (36.7 °C)  Pulse:  [] 76  Resp:  [8-39] 9  SpO2:  [90 %-100 %] 100 %  BP: ()/(34-85) 132/65     Weight: 50.8 kg (111 lb 15.9 oz)  Body mass index is 20.48 kg/(m^2).    Intake/Output Summary (Last 24 hours) at 03/14/17 1059  Last data filed at 03/14/17 0814   Gross per 24 hour   Intake          1528.66 ml   Output             2065 ml   Net          -536.34 ml      Physical Exam   Constitutional: She appears well-developed and well-nourished. No distress.   HENT:   OGT and ETT in place   Eyes: Conjunctivae are normal. Right eye exhibits no discharge. Left eye exhibits no discharge.   Neck:   Multiple soft tissue masses palpated.   Cardiovascular: Normal rate, regular rhythm, normal heart sounds and intact distal pulses.    Pulmonary/Chest:   Mechanically ventilated with coarse rhonchi.     Abdominal: Soft. Bowel sounds are normal. She exhibits no distension. There is no tenderness. There is no rebound and no guarding.   Musculoskeletal: She exhibits no deformity.   Neurological:   Sedated, but easily arousable   Skin: Skin is warm and dry. She is not diaphoretic.   Nursing note and vitals reviewed.      Significant Labs:   BMP:   Recent Labs  Lab 03/14/17  0150   *   *   K 3.1*      CO2 24   BUN 12   CREATININE 0.9   CALCIUM 8.4*     CBC:   Recent Labs  Lab 03/13/17  0139 03/14/17  0150   WBC 7.12 6.16   HGB 7.2* 7.4*   HCT 22.4* 22.7*    779

## 2017-03-14 NOTE — PROGRESS NOTES
Progress Note  Pulmonology    Admit Date: 3/10/2017   LOS: 3 days     SUBJECTIVE:LOC     Follow-up For: Acute Resp failure   No change - Nurse reports that she moves all ext - purposeful movement . No commands. Slightly opens eyes to voice.     Continuous Infusions:   fentanyl 2.5 mL/hr at 03/14/17 0600    propofol 30.067 mcg/kg/min (03/14/17 0600)     Scheduled Meds:   albuterol-ipratropium 2.5mg-0.5mg/3mL  3 mL Nebulization Q6H    aspirin  81 mg Oral Daily    chlorhexidine  15 mL Mouth/Throat BID    donepezil  10 mg Oral QHS    enoxaparin  40 mg Subcutaneous Daily    pantoprazole  40 mg Intravenous Daily    piperacillin-tazobactam 4.5 g in dextrose 5 % 100 mL IVPB (ready to mix system)  4.5 g Intravenous Q8H    vancomycin (VANCOCIN) IVPB  750 mg Intravenous Q24H     Review of Systems:  Review of systems not obtained due to patient factors Intubated .    OBJECTIVE:     Vital Signs Range (Last 24H):  Temp:  [98.1 °F (36.7 °C)-100.6 °F (38.1 °C)]   Pulse:  []   Resp:  [8-39]   BP: ()/(34-85)   SpO2:  [90 %-100 %]     I & O (Last 24H):    Intake/Output Summary (Last 24 hours) at 03/14/17 1013  Last data filed at 03/14/17 0814   Gross per 24 hour   Intake          1528.66 ml   Output             2065 ml   Net          -536.34 ml     Physical Exam:  General: no distress. Emaciated.Intubated on vent  Neck: bilobed LAD left cervical regions. No bruits.  Lungs: 7.5 ett. Decreased shallow bs with rhonchi bilaterally. No wheezing.  Heart: regular rate and rhythm and no murmur  Abdomen: soft, non-tender non-distended; decreased bowel. OGT.  Extremities: no cyanosis or edema, or clubbing. SCDs to LE  Neurologic: Sedated     Laboratory:  CBC:     Recent Labs  Lab 03/14/17  0150   WBC 6.16   RBC 2.91*   HGB 7.4*   HCT 22.7*      MCV 78*   MCH 25.4*   MCHC 32.6     BMP:     Recent Labs  Lab 03/12/17  0148  03/14/17  0150   *  < > 239*     < > 135*   K 3.7  < > 3.1*     < > 103   CO2  20*  < > 24   BUN 9  < > 12   CREATININE 0.8  < > 0.9   CALCIUM 8.4*  < > 8.4*   MG 1.5*  --   --    < > = values in this interval not displayed.  CMP:   Recent Labs  Lab 03/11/17  0255  03/14/17  0150   *  < > 239*   CALCIUM 9.2  < > 8.4*   ALBUMIN 3.3*  --   --    PROT 7.9  --   --      < > 135*   K 3.8  < > 3.1*   CO2 17*  < > 24     < > 103   BUN 17  < > 12   CREATININE 0.9  < > 0.9   ALKPHOS 59  --   --    ALT 14  --   --    AST 26  --   --    BILITOT 0.6  --   --    < > = values in this interval not displayed.     Vent Mode: CPAP  Oxygen Concentration (%):  [30-35] 30  Resp Rate Total:  [8 br/min-22 br/min] 11 br/min  Vt Set:  [400 mL] 400 mL  PEEP/CPAP:  [5 cmH20] 5 cmH20  Pressure Support:  [10 cmH20] 10 cmH20  Mean Airway Pressure:  [6.6 mgI72-86.4 cmH20] 7.3 cmH20    ABGs:     Recent Labs  Lab 03/14/17  0401   PH 7.443   PCO2 36.3   PO2 107*   HCO3 24.8   POCSATURATED 98   BE 1     Chest X-Ray:Endotracheal tube.  Nasogastric tube.  The superimposition of the nasogastric tube and endotracheal tube make visualization/anatomic location correlation difficult.  A lateral or repeat radiograph would be recommended.Lungs are unchanged with minimal atelectasis LEFT lung base. The cardiac silhouette is normal in size. The pulmonary vasculature is unremarkable. There is no pleural effusion or pneumothorax. The hilar and mediastinal contours are unremarkable. There are no acute bony abnormalities.    ASSESSMENT/PLAN:     Active Hospital Problems    Diagnosis  POA    *Acute respiratory failure with hypoxia [J96.01]  Yes    Neck malignant neoplasm [C76.0]  Yes    Pneumonia due to methicillin susceptible Staphylococcus aureus [J15.211]  Yes    On mechanically assisted ventilation [Z99.11]  Not Applicable    UTI (urinary tract infection) [N39.0]  Yes    Essential hypertension [I10]  Yes     Chronic    Type 2 diabetes mellitus [E11.9]  Yes     Chronic    Dementia without behavioral disturbance  [F03.90]  Yes    Anemia of chronic disease [D63.8]  Yes     Chronic      Resolved Hospital Problems    Diagnosis Date Resolved POA   No resolved problems to display.     Echo    1 - Moderately depressed left ventricular systolic function (EF 35-40%).     2 - Eccentric hypertrophy.     3 - Left ventricular diastolic dysfunction.     4 - Mild to moderate mitral regurgitation.     5 - Mild to moderate tricuspid regurgitation.    CT chest/neck:  #1. Marked prominence of the subcutaneous soft tissues of the anterior/inferior neck and superior/anterior chest.  The pattern of opacification is nonspecific but may represent a diffusely infiltrating process, possibly a malignancy.  This process abuts the thyroid which contains large nodules.,  Lymphoma or a diffusely infiltrating primary thyroid malignancy are within the differential.  There is also bulky superior mediastinal as well as left neck lymphadenopathy.  A dedicated thyroid ultrasound is recommended.  #2.  Patchy bilateral lung consolidation and ground glass opacification that may reflect infection but is nonspecific.  Additionally, there is dependent consolidation of the bilateral lungs that may reflect aspiration or infection.  There are also a couple of nonspecific lung nodules.  Malignancy/metastatic disease is not excluded.  #3.  Scattered lytic lesions within the cervical spine are nonspecific but could represent metastatic disease.    #4.  Cardiomegaly.  #5.  Coronary atherosclerosis.    #6.  Mild to moderate partially visualized intrahepatic bile duct dilatation.    Impression/recs:   1) Found down - etiology ? - Nurse reports that she moves all ext spontaneously but very weak and not very responsive.    2) Acute Resp failure - was intubated for airway protection. DDX pulm edema vs aspiration - vent as above. Weak. LAD. Will wean as tolerated. On empiric abx. Continue weaning efforts. Bronchodilators. Appears to have metastatic process of some sort. CT  chest and neck as above. Will need records from [facility].  3) Poss pulm edema - Echo as above. Positive balance. Check bnp in am.    4) ? Enlarged thyroid - See above. Suspect poor prognosis. Will need palliative care if not following already.    Unfortunate woman now on vent ; weaning underway ; seems to have terminal disease ; family conference in the making .      Marty Draper

## 2017-03-14 NOTE — PROGRESS NOTES
Results for JOSE CAMPOS (MRN [id number]) as of 3/14/2017 05:03   Ref. Range 3/14/2017 04:01   POC PH Latest Ref Range: 7.35 - 7.45  7.443   POC PCO2 Latest Ref Range: 35 - 45 mmHg 36.3   POC PO2 Latest Ref Range: 80 - 100 mmHg 107 (H)   POC BE Latest Ref Range: -2 to 2 mmol/L 1   POC HCO3 Latest Ref Range: 24 - 28 mmol/L 24.8   POC SATURATED O2 Latest Ref Range: 95 - 100 % 98   POC TCO2 Latest Ref Range: 23 - 27 mmol/L 26   FiO2 Unknown 30   PEEP Unknown 5   Sample Unknown ARTERIAL   DelSys Unknown Adult Vent   Allens Test Unknown Pass   Site Unknown RR   Mode Unknown CPAP   PS Unknown 10   Sp02 Unknown 100   Spont Rate Unknown 17   Pt now on CPAP, PEEP 5, PS 10, FIO2 @ 30%.

## 2017-03-14 NOTE — ASSESSMENT & PLAN NOTE
The patient's H/H is dropped  with no sign of active bleeding,will continue monitor.  H/H low, but stable.  Will not transfuse at this time.

## 2017-03-14 NOTE — PLAN OF CARE
Problem: Patient Care Overview  Goal: Plan of Care Review  Outcome: Ongoing (interventions implemented as appropriate)  Safety is maintained. Pt is free of falls/trauma/injury. Pt remains on mechanical ventilation per tracheostomy on SIMV settings. No sedation. Tolerating tube feeds. Pt had two liquid stools today.

## 2017-03-14 NOTE — PLAN OF CARE
Problem: Patient Care Overview  Goal: Plan of Care Review  Outcome: Ongoing (interventions implemented as appropriate)  Pt remains intubated in the ICU. NADN. Remain free from injury. Remain on propofol and fentanyl gtt. IV abx therapy for MRSA in sputum, UTI, and Ecoli in urine. Family to discuss change in code status. Plan of care reviewed with patient and family.

## 2017-03-14 NOTE — PROGRESS NOTES
[facility] Hospital Medicine  Progress Note    Patient Name: Teodoro Whitehead  MRN: [id number]  Patient Class: IP- Inpatient   Admission Date: 3/10/2017  Length of Stay: 3 days  Attending Physician: Vignesh Elena MD  Primary Care Provider: Primary Doctor No        Subjective:     Principal Problem:Acute respiratory failure with hypoxia    HPI:  Mrs. Teodoro Whitehead is a 82 y.o. female [facility] with essential hypertension, type 2 diabetes mellitus (HbA1c unknown), anemia of chronic disease, and dementia who presents to [facility] ED with complaints of unresponsiveness tonight.  She was found in this state at around 9:15 PM tonight.  She was reportedly shallow breathing.  EMS was activated and noted that her oxygen saturation on ambient air was 70% and that her heart rate was in the 140s.  Further history is limited at this time.    Hospital Course:  Mrs. Teodoro Whitehead is a 82 y.o. female [facility] resident with essential hypertension, type 2 diabetes mellitus (HbA1c unknown), anemia of chronic disease, and dementia who presents to [facility] ED with complaints of unresponsiveness  She was reportedly shallow breathing.  EMS was activated and noted that her oxygen saturation on ambient air was 70% and that her heart rate was in the 140s.Patient has been intubated for acute hypoxic respiratory failure,pulmonology doing vent management.likely duo to aspiration,on IV Abx with Vanc for MRSA on sputum.  Also with Ecoli UTI.  Spoke with SON. He mentioned patient has been diagnosed with cancer in neck in [place] just 10 days ago. CT chest and Neck show enlarge soft tissue swelling with lymphadenopathy in neck and metasatatic bone leseion in cervical area and possibly in lung.  has goiter in neck, but per son, it is chronic. Oncology in [facility] not recommending any therapy.      Interval History: Remains intubated.    Review of Systems   Unable to perform ROS: Intubated     Objective:      Vital Signs (Most Recent):  Temp: 98.1 °F (36.7 °C) (03/14/17 0800)  Pulse: 76 (03/14/17 0958)  Resp: (!) 9 (03/14/17 0958)  BP: 132/65 (03/14/17 0800)  SpO2: 100 % (03/14/17 0958) Vital Signs (24h Range):  Temp:  [98.1 °F (36.7 °C)-100.6 °F (38.1 °C)] 98.1 °F (36.7 °C)  Pulse:  [] 76  Resp:  [8-39] 9  SpO2:  [90 %-100 %] 100 %  BP: ()/(34-85) 132/65     Weight: 50.8 kg (111 lb 15.9 oz)  Body mass index is 20.48 kg/(m^2).    Intake/Output Summary (Last 24 hours) at 03/14/17 1059  Last data filed at 03/14/17 0814   Gross per 24 hour   Intake          1528.66 ml   Output             2065 ml   Net          -536.34 ml      Physical Exam   Constitutional: She appears well-developed and well-nourished. No distress.   HENT:   OGT and ETT in place   Eyes: Conjunctivae are normal. Right eye exhibits no discharge. Left eye exhibits no discharge.   Neck:   Multiple soft tissue masses palpated.   Cardiovascular: Normal rate, regular rhythm, normal heart sounds and intact distal pulses.    Pulmonary/Chest:   Mechanically ventilated with coarse rhonchi.     Abdominal: Soft. Bowel sounds are normal. She exhibits no distension. There is no tenderness. There is no rebound and no guarding.   Musculoskeletal: She exhibits no deformity.   Neurological:   Sedated, but easily arousable   Skin: Skin is warm and dry. She is not diaphoretic.   Nursing note and vitals reviewed.      Significant Labs:   BMP:   Recent Labs  Lab 03/14/17  0150   *   *   K 3.1*      CO2 24   BUN 12   CREATININE 0.9   CALCIUM 8.4*     CBC:   Recent Labs  Lab 03/13/17  0139 03/14/17  0150   WBC 7.12 6.16   HGB 7.2* 7.4*   HCT 22.4* 22.7*    214           Assessment/Plan:      * Acute respiratory failure with hypoxia  The etiology of her respiratory failure may duo to aspiration event..  She was intubated for airway protection and for hypoxia as she was with agonal respirations.    Patient has vita diagnosed with cancer in  neck after biopsy, probably metastatic thyroid cancer.  Trying to get records from [facility]. CT chest and neck shows malignancy with mets.  Unsure if neck lesions contributing to respiratory failure with possible aspiration.  Long discussion with patient's sons Ariana and Manuel.  Agree on DNR post extubation    On mechanically assisted ventilation  As addressed above.    UTI (urinary tract infection)  Ecoli  On Zosyn.    Essential hypertension  Patient's blood pressure is better-controlled; provide as-needed hydralazine.    Type 2 diabetes mellitus  She is on a home regimen of metformin but will start basal insulin therapy along with insulin sliding scale.    Dementia without behavioral disturbance  Will continue her home regimen of donepezil. Per family it is severe.    Anemia of chronic disease  The patient's H/H is dropped  with no sign of active bleeding,will continue monitor.  H/H low, but stable.  Will not transfuse at this time.    Neck malignant neoplasm  As above      Pneumonia due to methicillin susceptible Staphylococcus aureus  On sputum,started on Vanc.      VTE Risk Mitigation         Ordered     enoxaparin injection 40 mg  Daily     Route:  Subcutaneous        03/11/17 0345     Medium Risk of VTE  Once      03/11/17 0345        Critical Care time spent > 50 minutes     Vignesh Elena MD  Department of Hospital Medicine   [facility]

## 2017-03-14 NOTE — ASSESSMENT & PLAN NOTE
The etiology of her respiratory failure may duo to aspiration event..  She was intubated for airway protection and for hypoxia as she was with agonal respirations.    Patient has vita diagnosed with cancer in neck after biopsy, probably metastatic thyroid cancer.  Trying to get records from [place]. CT chest and neck shows malignancy with mets.  Unsure if neck lesions contributing to respiratory failure with possible aspiration.  Long discussion with patient's sons Ariana and Manuel.  Agree on DNR post extubation

## 2017-03-15 PROBLEM — Z99.11 ON MECHANICALLY ASSISTED VENTILATION: Status: RESOLVED | Noted: 2017-03-11 | Resolved: 2017-03-15

## 2017-03-15 LAB
ANION GAP SERPL CALC-SCNC: 10 MMOL/L
BASOPHILS # BLD AUTO: 0.03 K/UL
BASOPHILS NFR BLD: 0.4 %
BUN SERPL-MCNC: 7 MG/DL
CALCIUM SERPL-MCNC: 9.2 MG/DL
CHLORIDE SERPL-SCNC: 106 MMOL/L
CO2 SERPL-SCNC: 23 MMOL/L
CREAT SERPL-MCNC: 0.7 MG/DL
DIFFERENTIAL METHOD: ABNORMAL
EOSINOPHIL # BLD AUTO: 0.2 K/UL
EOSINOPHIL NFR BLD: 2.4 %
ERYTHROCYTE [DISTWIDTH] IN BLOOD BY AUTOMATED COUNT: 15.5 %
EST. GFR  (AFRICAN AMERICAN): >60 ML/MIN/1.73 M^2
EST. GFR  (NON AFRICAN AMERICAN): >60 ML/MIN/1.73 M^2
GLUCOSE SERPL-MCNC: 150 MG/DL
HCT VFR BLD AUTO: 22.6 %
HGB BLD-MCNC: 7.2 G/DL
LYMPHOCYTES # BLD AUTO: 1.5 K/UL
LYMPHOCYTES NFR BLD: 19.8 %
MCH RBC QN AUTO: 25.2 PG
MCHC RBC AUTO-ENTMCNC: 31.9 %
MCV RBC AUTO: 79 FL
MONOCYTES # BLD AUTO: 0.8 K/UL
MONOCYTES NFR BLD: 10.6 %
NEUTROPHILS # BLD AUTO: 4.9 K/UL
NEUTROPHILS NFR BLD: 66.5 %
PLATELET # BLD AUTO: 255 K/UL
PMV BLD AUTO: 10.6 FL
POCT GLUCOSE: 146 MG/DL (ref 70–110)
POCT GLUCOSE: 189 MG/DL (ref 70–110)
POCT GLUCOSE: 244 MG/DL (ref 70–110)
POCT GLUCOSE: 288 MG/DL (ref 70–110)
POTASSIUM SERPL-SCNC: 3.7 MMOL/L
RBC # BLD AUTO: 2.86 M/UL
SODIUM SERPL-SCNC: 139 MMOL/L
WBC # BLD AUTO: 7.39 K/UL

## 2017-03-15 PROCEDURE — 63600175 PHARM REV CODE 636 W HCPCS: Performed by: EMERGENCY MEDICINE

## 2017-03-15 PROCEDURE — 85025 COMPLETE CBC W/AUTO DIFF WBC: CPT

## 2017-03-15 PROCEDURE — 97161 PT EVAL LOW COMPLEX 20 MIN: CPT

## 2017-03-15 PROCEDURE — 25000003 PHARM REV CODE 250: Performed by: INTERNAL MEDICINE

## 2017-03-15 PROCEDURE — 63600175 PHARM REV CODE 636 W HCPCS: Performed by: INTERNAL MEDICINE

## 2017-03-15 PROCEDURE — 25000242 PHARM REV CODE 250 ALT 637 W/ HCPCS: Performed by: HOSPITALIST

## 2017-03-15 PROCEDURE — 25000003 PHARM REV CODE 250: Performed by: HOSPITALIST

## 2017-03-15 PROCEDURE — 92610 EVALUATE SWALLOWING FUNCTION: CPT

## 2017-03-15 PROCEDURE — 36415 COLL VENOUS BLD VENIPUNCTURE: CPT

## 2017-03-15 PROCEDURE — 11000001 HC ACUTE MED/SURG PRIVATE ROOM

## 2017-03-15 PROCEDURE — 80048 BASIC METABOLIC PNL TOTAL CA: CPT

## 2017-03-15 PROCEDURE — G8996 SWALLOW CURRENT STATUS: HCPCS | Mod: CJ

## 2017-03-15 PROCEDURE — G8997 SWALLOW GOAL STATUS: HCPCS | Mod: CI

## 2017-03-15 PROCEDURE — 94640 AIRWAY INHALATION TREATMENT: CPT

## 2017-03-15 PROCEDURE — 97165 OT EVAL LOW COMPLEX 30 MIN: CPT

## 2017-03-15 PROCEDURE — C9113 INJ PANTOPRAZOLE SODIUM, VIA: HCPCS | Performed by: HOSPITALIST

## 2017-03-15 PROCEDURE — 94761 N-INVAS EAR/PLS OXIMETRY MLT: CPT

## 2017-03-15 PROCEDURE — G8987 SELF CARE CURRENT STATUS: HCPCS | Mod: CL

## 2017-03-15 PROCEDURE — 25000242 PHARM REV CODE 250 ALT 637 W/ HCPCS: Performed by: INTERNAL MEDICINE

## 2017-03-15 PROCEDURE — G8988 SELF CARE GOAL STATUS: HCPCS | Mod: CK

## 2017-03-15 PROCEDURE — 25000003 PHARM REV CODE 250: Performed by: EMERGENCY MEDICINE

## 2017-03-15 PROCEDURE — 63600175 PHARM REV CODE 636 W HCPCS: Performed by: HOSPITALIST

## 2017-03-15 RX ORDER — LISINOPRIL 5 MG/1
10 TABLET ORAL DAILY
Status: DISCONTINUED | OUTPATIENT
Start: 2017-03-15 | End: 2017-03-20 | Stop reason: HOSPADM

## 2017-03-15 RX ORDER — IPRATROPIUM BROMIDE AND ALBUTEROL SULFATE 2.5; .5 MG/3ML; MG/3ML
3 SOLUTION RESPIRATORY (INHALATION)
Status: DISCONTINUED | OUTPATIENT
Start: 2017-03-15 | End: 2017-03-20 | Stop reason: HOSPADM

## 2017-03-15 RX ORDER — AMOXICILLIN AND CLAVULANATE POTASSIUM 875; 125 MG/1; MG/1
1 TABLET, FILM COATED ORAL EVERY 12 HOURS
Status: DISCONTINUED | OUTPATIENT
Start: 2017-03-15 | End: 2017-03-20 | Stop reason: HOSPADM

## 2017-03-15 RX ADMIN — CHLORHEXIDINE GLUCONATE 15 ML: 1.2 RINSE ORAL at 08:03

## 2017-03-15 RX ADMIN — AMOXICILLIN AND CLAVULANATE POTASSIUM 1 TABLET: 875; 125 TABLET, FILM COATED ORAL at 11:03

## 2017-03-15 RX ADMIN — IPRATROPIUM BROMIDE AND ALBUTEROL SULFATE 3 ML: .5; 3 SOLUTION RESPIRATORY (INHALATION) at 07:03

## 2017-03-15 RX ADMIN — IPRATROPIUM BROMIDE AND ALBUTEROL SULFATE 3 ML: .5; 3 SOLUTION RESPIRATORY (INHALATION) at 12:03

## 2017-03-15 RX ADMIN — PANTOPRAZOLE SODIUM 40 MG: 40 INJECTION, POWDER, FOR SOLUTION INTRAVENOUS at 09:03

## 2017-03-15 RX ADMIN — LISINOPRIL 10 MG: 5 TABLET ORAL at 11:03

## 2017-03-15 RX ADMIN — PIPERACILLIN AND TAZOBACTAM 4.5 G: 4; .5 INJECTION, POWDER, FOR SOLUTION INTRAVENOUS at 03:03

## 2017-03-15 RX ADMIN — AMOXICILLIN AND CLAVULANATE POTASSIUM 1 TABLET: 875; 125 TABLET, FILM COATED ORAL at 08:03

## 2017-03-15 RX ADMIN — IPRATROPIUM BROMIDE AND ALBUTEROL SULFATE 3 ML: .5; 3 SOLUTION RESPIRATORY (INHALATION) at 09:03

## 2017-03-15 RX ADMIN — ENOXAPARIN SODIUM 40 MG: 100 INJECTION SUBCUTANEOUS at 11:03

## 2017-03-15 RX ADMIN — PIPERACILLIN AND TAZOBACTAM 4.5 G: 4; .5 INJECTION, POWDER, FOR SOLUTION INTRAVENOUS at 09:03

## 2017-03-15 RX ADMIN — CHLORHEXIDINE GLUCONATE 15 ML: 1.2 RINSE ORAL at 09:03

## 2017-03-15 RX ADMIN — VANCOMYCIN HYDROCHLORIDE 750 MG: 750 INJECTION, POWDER, LYOPHILIZED, FOR SOLUTION INTRAVENOUS at 05:03

## 2017-03-15 RX ADMIN — ASPIRIN 81 MG: 81 TABLET, COATED ORAL at 09:03

## 2017-03-15 RX ADMIN — INSULIN ASPART 3 UNITS: 100 INJECTION, SOLUTION INTRAVENOUS; SUBCUTANEOUS at 05:03

## 2017-03-15 RX ADMIN — INSULIN ASPART 1 UNITS: 100 INJECTION, SOLUTION INTRAVENOUS; SUBCUTANEOUS at 12:03

## 2017-03-15 RX ADMIN — DONEPEZIL HYDROCHLORIDE 10 MG: 10 TABLET, FILM COATED ORAL at 08:03

## 2017-03-15 NOTE — ASSESSMENT & PLAN NOTE
She is on a home regimen of metformin but will start  insulin sliding scale.  ST recommending pureed diet.  Start diabetic diet.

## 2017-03-15 NOTE — ASSESSMENT & PLAN NOTE
On sputum, started on Vanc.  Afebrile.  Doing well.  Would probably give a couple more days of Vanc and then stop.

## 2017-03-15 NOTE — PT/OT/SLP EVAL
Occupational Therapy  Evaluation    Teodoro Whitehead   MRN: [id number]   Admitting Diagnosis: Acute respiratory failure with hypoxia    OT Date of Treatment: 03/15/17   OT Start Time:   OT Stop Time: 153  OT Total Time (min): 11 min    Billable Minutes:  Evaluation 11 minutes (co-eval with PT)    Diagnosis: Acute respiratory failure with hypoxia       Past Medical History:   Diagnosis Date    Cancer     Dementia     Diabetes mellitus     Hypertension     Thyroid disease       History reviewed. No pertinent surgical history.    Referring physician: Dr. Elena  Date referred to OT: 3/15/2017    General Precautions: Standard, aspiration, fall  Orthopedic Precautions: N/A  Braces: N/A    Do you have any cultural, spiritual, Jewish conflicts, given your current situation?: none     Patient History:  Living Environment  Lives With: facility resident  Living Arrangements: extended care facility (SNF at [facility])  Transportation Available: agency transportation  Equipment Currently Used at Home: wheelchair    Prior level of function:   Bed Mobility/Transfers: needs device and assist  Grooming: needs device and assist  Bathing: needs device and assist  Upper Body Dressing: needs device and assist  Lower Body Dressing: needs device and assist  Toileting: needs device and assist        Dominant hand: right    Subjective:  Communicated with nursing prior to session.    Chief Complaint: unable to assess  Patient/Family stated goals: to return to [facility]    Pain Ratin/10              Pain Rating Post-Intervention: 0/10    Objective:  Patient found with: peripheral IV    Cognitive Exam:  Oriented to: Person  Follows Commands/attention: Inattentive and Easily distracted  Communication: expressive aphasia  Memory:  Unable to assess  Safety awareness/insight to disability: impaired  Coping skills/emotional control: unable to assess    Visual/perceptual:  Intact    Physical Exam:  Postural examination/scapula alignment:  "Rounded shoulder and Head forward  Skin integrity: Visible skin intact  Edema: None noted     Sensation:   Intact    Upper Extremity Range of Motion:  Right Upper Extremity: WFL  Left Upper Extremity: WFL    Upper Extremity Strength:  Right Upper Extremity: WFL  Left Upper Extremity: WFL   Strength: WFL    Fine motor coordination:   Unable to assess    Gross motor coordination: WFL    Functional Mobility:  Bed Mobility:  Rolling/Turning to Left: Supervision  Rolling/Turning Right: Supervision  Supine to Sit: Moderate Assistance  Sit to Supine: Moderate Assistance    Transfers:  Sit <> Stand Assistance: Moderate Assistance  Sit <> Stand Assistive Device: No Assistive Device (hand held assistance X2 people)    Functional Ambulation: ambulated around the bed with HHA x2 people, incontinent of bowel    Activities of Daily Living:  Feeding Level of Assistance: Minimum assistance  UE Dressing Level of Assistance: Moderate assistance  LE Dressing Level of Assistance: Maximum assistance    Balance:   Static Sit: POOR+: Needs MINIMAL assist to maintain  Dynamic Sit: POOR: N/A  Static Stand: POOR: Needs MODERATE assist to maintain  Dynamic stand: POOR: N/A      AM-PAC 6 CLICK ADL  How much help from another person does this patient currently need?  1 = Unable, Total/Dependent Assistance  2 = A lot, Maximum/Moderate Assistance  3 = A little, Minimum/Contact Guard/Supervision  4 = None, Modified Foard/Independent    Putting on and taking off regular lower body clothing? : 2  Bathing (including washing, rinsing, drying)?: 2  Toileting, which includes using toilet, bedpan, or urinal? : 2  Putting on and taking off regular upper body clothing?: 2  Taking care of personal grooming such as brushing teeth?: 2  Eating meals?: 3  Total Score: 13    AM-PAC Raw Score CMS "G-Code Modifier Level of Impairment Assistance   6 % Total / Unable   7 - 9 CM 80 - 100% Maximal Assist   10 - 14 CL 60 - 80% Moderate Assist   15 - " 19 CK 40 - 60% Moderate Assist   20 - 22 CJ 20 - 40% Minimal Assist   23 CI 1-20% SBA / CGA   24 CH 0% Independent/ Mod I       Patient left right sidelying with all lines intact, call button in reach and nurse notified    Assessment:  Teodoro Whitehead is a 82 y.o. female with a medical diagnosis of Acute respiratory failure with hypoxia and presents with  independence for self-care and functional transfers.    Rehab identified problem list/impairments: Rehab identified problem list/impairments: weakness, impaired endurance, impaired functional mobilty, impaired cognition, decreased upper extremity function, impaired coordination    Rehab potential is fair.    Activity tolerance: Fair    Discharge recommendations: Discharge Facility/Level Of Care Needs: nursing facility, skilled (Return to [facility])     Barriers to discharge: None    Equipment recommendations: none     GOALS:   Occupational Therapy Goals        Problem: Occupational Therapy Goal    Goal Priority Disciplines Outcome Interventions   Occupational Therapy Goal     OT, PT/OT Ongoing (interventions implemented as appropriate)    Description:  Goals to be met by: 3/22/2017     Patient will increase functional independence with ADLs by performing:    UE Dressing with Moderate Assistance.  LE Dressing with Moderate Assistance.  Grooming while seated with Moderate Assistance.  Upper extremity exercise program with assistance as needed.                PLAN:  Patient to be seen 2 x/week to address the above listed problems via self-care/home management, therapeutic activities, therapeutic exercises  Plan of Care expires: 17  Plan of Care reviewed with: patient    OT G-codes  Functional Assessment Tool Used: [facility]  Score: 13  Functional Limitation: Self care  Self Care Current Status (): CL  Self Care Goal Status (): YANG Pino, MS  03/15/2017

## 2017-03-15 NOTE — PLAN OF CARE
Problem: Occupational Therapy Goal  Goal: Occupational Therapy Goal  Goals to be met by: 3/22/2017     Patient will increase functional independence with ADLs by performing:    UE Dressing with Moderate Assistance.  LE Dressing with Moderate Assistance.  Grooming while seated with Moderate Assistance.  Upper extremity exercise program with assistance as needed.  Outcome: Ongoing (interventions implemented as appropriate)  Patient tolerated evaluation fair, fair participation.  Patient will benefit from skilled OT services to address the above mentioned goals. YANG Rothman, MS

## 2017-03-15 NOTE — PROGRESS NOTES
[facility] Hospital Medicine  Progress Note    Patient Name: Teodoro Whitehead  MRN: [id number]  Patient Class: IP- Inpatient   Admission Date: 3/10/2017  Length of Stay: 4 days  Attending Physician: Vignesh Elena MD  Primary Care Provider: Primary Doctor No        Subjective:     Principal Problem:Acute respiratory failure with hypoxia    HPI:  Mrs. Teodoro Whitehead is a 82 y.o. female [facility] resident with essential hypertension, type 2 diabetes mellitus (HbA1c unknown), anemia of chronic disease, and dementia who presents to [facility] ED with complaints of unresponsiveness tonight.  She was found in this state at around 9:15 PM tonight.  She was reportedly shallow breathing.  EMS was activated and noted that her oxygen saturation on ambient air was 70% and that her heart rate was in the 140s.  Further history is limited at this time.    Hospital Course:  Mrs. Teodoro Whitehead is a 82 y.o. female [facility] resident with essential hypertension, type 2 diabetes mellitus (HbA1c unknown), anemia of chronic disease, and dementia who presents to [facility] ED with complaints of unresponsiveness  She was reportedly shallow breathing.  EMS was activated and noted that her oxygen saturation on ambient air was 70% and that her heart rate was in the 140s. Patient has been intubated for acute hypoxic respiratory failure, pulmonology on vent management.  Likely due to aspiration. On IV Abx with Vanc for MRSA on sputum.  Also with Ecoli UTI. CT chest and Neck show enlarge soft tissue swelling with lymphadenopathy in neck and metasatatic bone leseion in cervical area and possibly in lung.  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 Oncology in [facility] not recommending any therapy.  Patient extubated on 3/14 and currently doing well on NC.  She was also made DNR prior to extubation after discussion with sons.      recommending pureed diet.      Interval History: Extubated yesterday and doing well on NC.    Review of Systems   Unable to perform ROS: Dementia     Objective:     Vital Signs (Most Recent):  Temp: 98.1 °F (36.7 °C) (03/15/17 0730)  Pulse: 76 (03/15/17 0830)  Resp: 15 (03/15/17 0830)  BP: (!) 154/70 (03/15/17 0830)  SpO2: 96 % (03/15/17 0830) Vital Signs (24h Range):  Temp:  [98 °F (36.7 °C)-99.5 °F (37.5 °C)] 98.1 °F (36.7 °C)  Pulse:  [] 76  Resp:  [8-36] 15  SpO2:  [79 %-100 %] 96 %  BP: (112-157)/() 154/70     Weight: 51.7 kg (113 lb 15.7 oz)  Body mass index is 20.85 kg/(m^2).    Intake/Output Summary (Last 24 hours) at 03/15/17 1006  Last data filed at 03/15/17 0600   Gross per 24 hour   Intake            663.4 ml   Output             1055 ml   Net           -391.6 ml      Physical Exam   Constitutional: No distress.   HENT:   Head: Normocephalic and atraumatic.   Eyes: Conjunctivae are normal. Right eye exhibits no discharge. Left eye exhibits no discharge.   Neck:   Multiple soft tissue masses palpated   Cardiovascular: Normal rate, regular rhythm and normal heart sounds.    Pulmonary/Chest: Effort normal and breath sounds normal.   Abdominal: Soft. Bowel sounds are normal.   Musculoskeletal: She exhibits no deformity.   Neurological:   Awakes to tactile stimuli  Does not respond to questions.   Skin: Skin is warm and dry.       Significant Labs:   BMP:   Recent Labs  Lab 03/15/17  0153   *      K 3.7      CO2 23   BUN 7*   CREATININE 0.7   CALCIUM 9.2     CBC:   Recent Labs  Lab 03/14/17  0150 03/15/17  0153   WBC 6.16 7.39   HGB 7.4* 7.2*   HCT 22.7* 22.6*    255           Assessment/Plan:      * Acute respiratory failure with hypoxia  The etiology of her respiratory failure may due to aspiration event.  She was intubated for airway protection and for hypoxia as she was with agonal respirations.    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 CT chest and neck shows malignancy with mets.  Unsure if neck lesions contributing to respiratory failure with possible aspiration.  Long discussion with patient's sons Ariana and Manuel.  Agree on DNR post extubation.  Patient extubated yesterday and currently doing well on NC.  ST recommending pureed diet.  Aspiration precautions.  Will transfer to floor.  PT/OT evaluation.  Probably need to discuss hospice with family.    UTI (urinary tract infection)  Ecoli  On Zosyn.  Will change to PO Augmentin.    Essential hypertension  Will resume home Lisinopril at lower dose with parameters.    Type 2 diabetes mellitus  She is on a home regimen of metformin but will start  insulin sliding scale.  ST recommending pureed diet.  Start diabetic diet.      Dementia without behavioral disturbance  Will continue her home regimen of donepezil.  Per family it is severe.    Anemia of chronic disease  The patient's H/H is dropped  with no sign of active bleeding.  H/H low, but stable.  Will not transfuse at this time.    Neck malignant neoplasm  As above      Pneumonia due to methicillin susceptible Staphylococcus aureus  On sputum, started on Vanc.  Afebrile.  Doing well.  Would probably give a couple more days of Vanc and then stop.      VTE Risk Mitigation         Ordered     enoxaparin injection 40 mg  Daily     Route:  Subcutaneous        03/11/17 0345     Medium Risk of VTE  Once      03/11/17 0345          Vignesh Elena MD  Department of Hospital Medicine   [facility]

## 2017-03-15 NOTE — SUBJECTIVE & OBJECTIVE
Interval History: Extubated yesterday and doing well on NC.    Review of Systems   Unable to perform ROS: Dementia     Objective:     Vital Signs (Most Recent):  Temp: 98.1 °F (36.7 °C) (03/15/17 0730)  Pulse: 76 (03/15/17 0830)  Resp: 15 (03/15/17 0830)  BP: (!) 154/70 (03/15/17 0830)  SpO2: 96 % (03/15/17 0830) Vital Signs (24h Range):  Temp:  [98 °F (36.7 °C)-99.5 °F (37.5 °C)] 98.1 °F (36.7 °C)  Pulse:  [] 76  Resp:  [8-36] 15  SpO2:  [79 %-100 %] 96 %  BP: (112-157)/() 154/70     Weight: 51.7 kg (113 lb 15.7 oz)  Body mass index is 20.85 kg/(m^2).    Intake/Output Summary (Last 24 hours) at 03/15/17 1006  Last data filed at 03/15/17 0600   Gross per 24 hour   Intake            663.4 ml   Output             1055 ml   Net           -391.6 ml      Physical Exam   Constitutional: No distress.   HENT:   Head: Normocephalic and atraumatic.   Eyes: Conjunctivae are normal. Right eye exhibits no discharge. Left eye exhibits no discharge.   Neck:   Multiple soft tissue masses palpated   Cardiovascular: Normal rate, regular rhythm and normal heart sounds.    Pulmonary/Chest: Effort normal and breath sounds normal.   Abdominal: Soft. Bowel sounds are normal.   Musculoskeletal: She exhibits no deformity.   Neurological:   Awakes to tactile stimuli  Does not respond to questions.   Skin: Skin is warm and dry.       Significant Labs:   BMP:   Recent Labs  Lab 03/15/17  0153   *      K 3.7      CO2 23   BUN 7*   CREATININE 0.7   CALCIUM 9.2     CBC:   Recent Labs  Lab 03/14/17  0150 03/15/17  0153   WBC 6.16 7.39   HGB 7.4* 7.2*   HCT 22.7* 22.6*    255

## 2017-03-15 NOTE — NURSING
Pt transferred to Med-Surg unit via bed. Report given to Bozena RICO. Pt awake and alert. Tolerated lunch well. IV site flushed without difficulty.

## 2017-03-15 NOTE — PROGRESS NOTES
Transfer Note    Mrs. Teodoro Whitehead is a 82 y.o. female [facility] resident with essential hypertension, type 2 diabetes mellitus (HbA1c unknown), anemia of chronic disease, and dementia who presents to [facility] ED with complaints of unresponsiveness She was reportedly shallow breathing. EMS was activated and noted that her oxygen saturation on ambient air was 70% and that her heart rate was in the 140s. Patient has been intubated for acute hypoxic respiratory failure, pulmonology on vent management. Likely due to aspiration. On IV Abx with Vanc for MRSA on sputum. Also with Ecoli UTI. CT chest and Neck show enlarge soft tissue swelling with lymphadenopathy in neck and metasatatic bone leseion in cervical area and possibly in lung. 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 Oncology in [facility] not recommending any therapy.  Patient extubated on 3/14 and currently doing well on NC. She was also made DNR prior to extubation after discussion with sons.   ST recommending pureed diet.  Transfer to floor.  PT/OT evaluation.  Probably needs Hospice at NH.

## 2017-03-15 NOTE — PLAN OF CARE
Problem: Patient Care Overview  Goal: Plan of Care Review  Outcome: Ongoing (interventions implemented as appropriate)  Safety is maintained. Pt is free of falls/trauma/injury and skin is intact. Pt was extubated today and is tolerating nasal cannula. Continues to require restraints despite being extubated since patient is attempting to climb over the side rails at times to get out of bed. Pt is not verbalizing so it is not possible to assess whether she is oriented and will not follow commands.

## 2017-03-15 NOTE — ASSESSMENT & PLAN NOTE
The patient's H/H is dropped  with no sign of active bleeding.  H/H low, but stable.  Will not transfuse at this time.

## 2017-03-15 NOTE — PROGRESS NOTES
Pt arrived to [facility] via bed with transport. Pt curled in fetal position, pt turned with Marilou RICO. Skin CDI. Whitehead catheter in place. Pt alert, non verbal to questions, lifts arms and legs on command. Bed alarm low and locked. Door open. Will continue to monitor.

## 2017-03-15 NOTE — ASSESSMENT & PLAN NOTE
The etiology of her respiratory failure may due to aspiration event.  She was intubated for airway protection and for hypoxia as she was with agonal respirations.    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 CT chest and neck shows malignancy with mets.  Unsure if neck lesions contributing to respiratory failure with possible aspiration.  Long discussion with patient's sons Ariana and Manuel.  Agree on DNR post extubation.  Patient extubated yesterday and currently doing well on NC.  ST recommending pureed diet.  Aspiration precautions.  Will transfer to floor.  PT/OT evaluation.  Probably need to discuss hospice with family.

## 2017-03-15 NOTE — PLAN OF CARE
Problem: Patient Care Overview  Goal: Plan of Care Review  Outcome: Ongoing (interventions implemented as appropriate)  VSS. Extubated yesterday and now stable on 2 L/min nasal canula. Waking up more, able to state name, birthday, and identify her son. Follows commands. Restraints removed safely. Whitehead intact with good output. Remains free from fall, injury, or breakdown throughout shift.

## 2017-03-15 NOTE — PROGRESS NOTES
Pt attempting to get OOB, bed alarm on zone 2. No Avasys available at this time. Will continue to monitor.

## 2017-03-15 NOTE — PLAN OF CARE
Problem: SLP Goal  Goal: SLP Goal  Short Term Goals:   1. Pt will tolerate puree diet and thin liquids with no overt s/s of aspiration noted.   2. Pt will tolerate dental soft trials x5 for possible diet upgrade with no overt s/s of aspiration.   Outcome: Ongoing (interventions implemented as appropriate)  Bedside swallow study completed. Pt presented with no overt s/s of aspiration with thin liquids and puree trials. Pt with decreased mastication and slow oral transit time with solid trials requiring multiple liquid washes to clear oral cavity. Pt edentulous. Unknown if pt typically eats with dentures. ST to f/u to assess diet tolerance and possible diet upgrade pending increased alertness.  DERRICK Hoffman., CCC-SLP  03/15/2017

## 2017-03-15 NOTE — PT/OT/SLP EVAL
Speech Language Pathology  Bedside Swallow Study  Evaluation    Teodoro Whitehead   MRN: [id number]   W271/W271 A    Admitting Diagnosis: Acute respiratory failure with hypoxia    Diet recommendations: Solid Diet Level: Puree  Liquid Diet Level: Thin Feed only when awake/alert, HOB to 90 degrees, Check for pocketing/oral residue, Remain upright 30 minutes post meal, Meds whole 1 at a time and Assistance with meals    SLP Treatment Date: 03/15/17  Speech Start Time: 0923     Speech Stop Time: 0933     Speech Total (min): 10 min       TREATMENT BILLABLE MINUTES:  Eval Swallow and Oral Function 10    Diagnosis: Acute respiratory failure with hypoxia    H&P: Mrs. Teodoro Whitehead is a 82 y.o. female [facility] resident with essential hypertension, type 2 diabetes mellitus (HbA1c unknown), anemia of chronic disease, and dementia who presents to [facility] ED with complaints of unresponsiveness tonight. She was found in this state at around 9:15 PM tonight. She was reportedly shallow breathing. EMS was activated and noted that her oxygen saturation on ambient air was 70% and that her heart rate was in the 140s. Further history is limited at this time.    CXR 3/13: Endotracheal tube.  Nasogastric tube.  The superimposition of the nasogastric tube and endotracheal tube make visualization/anatomic location correlation difficult.  A lateral or repeat radiograph would be recommended.Lungs are unchanged with minimal atelectasis LEFT lung base. The cardiac silhouette is normal in size. The pulmonary vasculature is unremarkable. There is no pleural effusion or pneumothorax. The hilar and mediastinal contours are unremarkable. There are no acute bony abnormalities.    Pt extubated 3/14.    Past Medical History:   Diagnosis Date    Cancer     Dementia     Diabetes mellitus     Hypertension     Thyroid disease      History reviewed. No pertinent surgical history.    Has the patient been evaluated by SLP for swallowing? :  Yes  Keep patient NPO?: No   General Precautions: Standard, aspiration          Prior diet: unknown.    Subjective:  Pt awake and lethargic. RN present in room.     Pain Ratin/10  Pain Rating Post-Intervention: 0/10    Objective:      Oral Musculature Evaluation  Oral Musculature: unable to assess due to poor participation/comprehension  Dentition: edentulous  Mucosal Quality: adequate  Voice Prior to PO Intake: no vocalizations     Bedside Swallow Eval:  Consistencies Assessed:   THIN:-ice chip x1, self regulated cup sip of water x2, self regulated straw sip of water x3  PUREE:- tsp bites of apple sauce x3  SOLID: -bite of lew cracker x1 with multiple liquid washes to clear  Oral Phase: anterior loss, excess chewing, impaired rotational chew and slow oral transit time  Pharyngeal Phase: no overt clinical  signs/symptoms of aspiration and no overt clinical signs/symptoms of pharyngeal dysphagia    Bedside swallow study completed. Pt presented with no overt s/s of aspiration with thin liquids and puree trials. Pt with decreased mastication and slow oral transit time with solid trials requiring multiple liquid washes to clear oral cavity. Pt edentulous. Unknown if pt typically eats with dentures. ST to f/u to assess diet tolerance and possible diet upgrade pending increased alertness.    Assessment:  Teodoro Whitehead is a 82 y.o. female with a medical diagnosis of Acute respiratory failure with hypoxia and presents with no overt s/s of aspiration with thin liquids and puree trials. Pt with decreased mastication and slow oral transit time with solid trials requiring multiple liquid washes to clear oral cavity.      Discharge recommendations: Discharge Facility/Level Of Care Needs:  (tbd)     Goals:   SLP Goals        Problem: SLP Goal    Goal Priority Disciplines Outcome   SLP Goal     SLP Ongoing (interventions implemented as appropriate)   Description:  Short Term Goals:   1. Pt will tolerate puree diet and  thin liquids with no overt s/s of aspiration noted.   2. Pt will tolerate dental soft trials x5 for possible diet upgrade with no overt s/s of aspiration.                Plan:   Patient to be seen Therapy Frequency: 3 x/week   Plan of Care expires: 04/13/17  Plan of Care reviewed with: patient (RN)  SLP Follow-up?: Yes  SLP - Next Visit Date: 03/16/17      SLP G-Codes  Functional Assessment Tool Used: noms  Score: 5  Functional Limitations: Swallowing  Swallow Current Status (): LAUREEN  Swallow Goal Status (): FLORENTIN Cannon, CCC-SLP  03/15/2017

## 2017-03-15 NOTE — PLAN OF CARE
Problem: Physical Therapy Goal  Goal: Physical Therapy Goal  Goals to be met by: 3/29/17    Patient will increase functional independence with mobility by performin. Supine to sit with Moderate Assistance  2. Sit to stand transfer with Moderate Assistance  3. Gait x 100 feet with Moderate Assistance and rolling walker     Patient would benefit from SNF at discharge. Full report to follow.

## 2017-03-15 NOTE — PLAN OF CARE
03/15/17 1131   Discharge Reassessment   Assessment Type Discharge Planning Reassessment   Can the patient answer the patient profile reliably? No, cognitively impaired   How does the patient rate their overall health at the present time? Good  (record)   Describe the patient's ability to walk at the present time. Does not walk or unable to take any steps at all   How often would a person be available to care for the patient? Often   Number of comorbid conditions (as recorded on the chart) Five or more   During the past month, has the patient often been bothered by feeling down, depressed or hopeless? No  (record)   During the past month, has the patient often been bothered by little interest or pleasure in doing things? No  (record)   Discharge plan remains the same: Yes   Provided patient/caregiver education on the expected discharge date and the discharge plan No   Discharge Plan A Skilled Nursing Facility  (at [facility])   Discharge Plan B Return to Nursing Home  (at [facility])   Change in patient condition or support system Yes   Patient choice form signed by patient/caregiver No   Explained to the the patient/caregiver why the discharge planned changed: No   Involved the patient/caregiver in establishing a new discharge plan: No   patient now extubated, was on room air when PREET attempted to meet with patient. She was sleeping soundly, did not awaken. Expects to transfer to room [facility] soon.   PREET spoke with son Mary Camara [phone] who confirms plan to return to [facility]. He expects to visit patient later this evening. PREET printed IMM (copy for son and page 3 for son to initial for hospital). Son informs he is familiar with form, has no questions and will complete when he visits [facility]. PREET labeled the forms, added card with contact information and provided to chart to transfer.   PREET contacted Ashley at [facility] ([phone]), provided update. Sent clinical information thru Health system.  Patient was SNF following recent hospitaliztion at another facility. Ashley expects this return with SNF orders.   PREET reviewed chart.   Patient's original LaPOST document is with the chart. Will need to go with patient at discharge. SW will place sticky note. Did not see copy of the HCPOA requested.   SW/CM will continue to follow and assist as needed.

## 2017-03-15 NOTE — PHYSICIAN QUERY
PT Name: Teodoro Whitehead  MR #: [id number]     Physician Query Form - Documentation Clarification    Reviewer: [email or web address]    This form is a permanent document in the medical record.     Query Date: March 15, 2017  By submitting this query, we are merely seeking further clarification of documentation to reflect the severity of illness of your patient. Please utilize your independent clinical judgment when addressing the question(s) below.    (The Medical record reflects the following:)      Supporting Clinical Findings Location in Medical Record     Pneumonia due to methicilin susceptible Staphylococcus aureus       PN 3/14   Respiratory Culture:  Methicillin Resistant Staphylococcus aureus        labs 3/11                                                                            Doctor, Please specify diagnosis or diagnoses associated with above clinical findings.    Physician Use Only      [ x  ] pneumonia due to MRSA    [   ] pneumonia due to MSSA    [   ] Other pneumonia/specify: ____________________________________                                                                                                           [  ] Unable to determine

## 2017-03-16 PROBLEM — J15.212 PNEUMONIA DUE TO METHICILLIN RESISTANT STAPHYLOCOCCUS AUREUS: Status: ACTIVE | Noted: 2017-03-13

## 2017-03-16 LAB
BACTERIA BLD CULT: NORMAL
BACTERIA BLD CULT: NORMAL
BASOPHILS # BLD AUTO: 0.06 K/UL
BASOPHILS NFR BLD: 1 %
DIFFERENTIAL METHOD: ABNORMAL
EOSINOPHIL # BLD AUTO: 0.4 K/UL
EOSINOPHIL NFR BLD: 6.4 %
ERYTHROCYTE [DISTWIDTH] IN BLOOD BY AUTOMATED COUNT: 15.5 %
HCT VFR BLD AUTO: 25.5 %
HGB BLD-MCNC: 8.2 G/DL
LYMPHOCYTES # BLD AUTO: 1.4 K/UL
LYMPHOCYTES NFR BLD: 23.5 %
MCH RBC QN AUTO: 25.4 PG
MCHC RBC AUTO-ENTMCNC: 32.2 %
MCV RBC AUTO: 79 FL
MONOCYTES # BLD AUTO: 0.7 K/UL
MONOCYTES NFR BLD: 11.9 %
NEUTROPHILS # BLD AUTO: 3.5 K/UL
NEUTROPHILS NFR BLD: 56.9 %
PLATELET # BLD AUTO: 323 K/UL
PMV BLD AUTO: 10 FL
POCT GLUCOSE: 230 MG/DL (ref 70–110)
POCT GLUCOSE: 252 MG/DL (ref 70–110)
POCT GLUCOSE: 273 MG/DL (ref 70–110)
POCT GLUCOSE: 291 MG/DL (ref 70–110)
RBC # BLD AUTO: 3.23 M/UL
WBC # BLD AUTO: 6.13 K/UL

## 2017-03-16 PROCEDURE — 25000003 PHARM REV CODE 250: Performed by: HOSPITALIST

## 2017-03-16 PROCEDURE — 36415 COLL VENOUS BLD VENIPUNCTURE: CPT

## 2017-03-16 PROCEDURE — 11000001 HC ACUTE MED/SURG PRIVATE ROOM

## 2017-03-16 PROCEDURE — G8980 MOBILITY D/C STATUS: HCPCS | Mod: CL

## 2017-03-16 PROCEDURE — 85025 COMPLETE CBC W/AUTO DIFF WBC: CPT

## 2017-03-16 PROCEDURE — C9113 INJ PANTOPRAZOLE SODIUM, VIA: HCPCS | Performed by: HOSPITALIST

## 2017-03-16 PROCEDURE — 63600175 PHARM REV CODE 636 W HCPCS: Performed by: HOSPITALIST

## 2017-03-16 PROCEDURE — 25000242 PHARM REV CODE 250 ALT 637 W/ HCPCS: Performed by: HOSPITALIST

## 2017-03-16 PROCEDURE — 94640 AIRWAY INHALATION TREATMENT: CPT

## 2017-03-16 PROCEDURE — 25000003 PHARM REV CODE 250: Performed by: INTERNAL MEDICINE

## 2017-03-16 PROCEDURE — G8978 MOBILITY CURRENT STATUS: HCPCS | Mod: CL

## 2017-03-16 PROCEDURE — 63600175 PHARM REV CODE 636 W HCPCS: Performed by: INTERNAL MEDICINE

## 2017-03-16 PROCEDURE — G8979 MOBILITY GOAL STATUS: HCPCS | Mod: CK

## 2017-03-16 RX ADMIN — INSULIN ASPART 1 UNITS: 100 INJECTION, SOLUTION INTRAVENOUS; SUBCUTANEOUS at 08:03

## 2017-03-16 RX ADMIN — AMOXICILLIN AND CLAVULANATE POTASSIUM 1 TABLET: 875; 125 TABLET, FILM COATED ORAL at 08:03

## 2017-03-16 RX ADMIN — INSULIN ASPART 2 UNITS: 100 INJECTION, SOLUTION INTRAVENOUS; SUBCUTANEOUS at 01:03

## 2017-03-16 RX ADMIN — RAMELTEON 8 MG: 8 TABLET, FILM COATED ORAL at 08:03

## 2017-03-16 RX ADMIN — INSULIN ASPART 3 UNITS: 100 INJECTION, SOLUTION INTRAVENOUS; SUBCUTANEOUS at 10:03

## 2017-03-16 RX ADMIN — AMOXICILLIN AND CLAVULANATE POTASSIUM 1 TABLET: 875; 125 TABLET, FILM COATED ORAL at 10:03

## 2017-03-16 RX ADMIN — IPRATROPIUM BROMIDE AND ALBUTEROL SULFATE 3 ML: .5; 3 SOLUTION RESPIRATORY (INHALATION) at 01:03

## 2017-03-16 RX ADMIN — LISINOPRIL 10 MG: 5 TABLET ORAL at 10:03

## 2017-03-16 RX ADMIN — PANTOPRAZOLE SODIUM 40 MG: 40 INJECTION, POWDER, FOR SOLUTION INTRAVENOUS at 10:03

## 2017-03-16 RX ADMIN — CHLORHEXIDINE GLUCONATE 15 ML: 1.2 RINSE ORAL at 10:03

## 2017-03-16 RX ADMIN — ASPIRIN 81 MG: 81 TABLET, COATED ORAL at 10:03

## 2017-03-16 RX ADMIN — DONEPEZIL HYDROCHLORIDE 10 MG: 10 TABLET, FILM COATED ORAL at 08:03

## 2017-03-16 RX ADMIN — ENOXAPARIN SODIUM 40 MG: 100 INJECTION SUBCUTANEOUS at 01:03

## 2017-03-16 RX ADMIN — CHLORHEXIDINE GLUCONATE 15 ML: 1.2 RINSE ORAL at 09:03

## 2017-03-16 RX ADMIN — VANCOMYCIN HYDROCHLORIDE 750 MG: 750 INJECTION, POWDER, LYOPHILIZED, FOR SOLUTION INTRAVENOUS at 05:03

## 2017-03-16 RX ADMIN — INSULIN ASPART 3 UNITS: 100 INJECTION, SOLUTION INTRAVENOUS; SUBCUTANEOUS at 05:03

## 2017-03-16 RX ADMIN — IPRATROPIUM BROMIDE AND ALBUTEROL SULFATE 3 ML: .5; 3 SOLUTION RESPIRATORY (INHALATION) at 08:03

## 2017-03-16 RX ADMIN — IPRATROPIUM BROMIDE AND ALBUTEROL SULFATE 3 ML: .5; 3 SOLUTION RESPIRATORY (INHALATION) at 11:03

## 2017-03-16 NOTE — PROGRESS NOTES
SW informed by PREET Giordano, that she was unable to get the IMM notice signed before pt left ICU. Called pt's son, Ariana Walls, at [phone] and explained the IMM notice and he verbalized an understanding of the notice. Certified notice to be mailed to Mr. Walls corrected address of [address] [place] [id number].    The LaPost document was also transferred with the chart. The original notice placed in the pt's envelope to be transported with the patient when returned to the facility. ([facility]).

## 2017-03-16 NOTE — SUBJECTIVE & OBJECTIVE
Interval History: Extubated and stable .but not talking,musy be fed.    Review of Systems   Unable to perform ROS: Dementia     Objective:     Vital Signs (Most Recent):  Temp: 98.5 °F (36.9 °C) (03/16/17 0451)  Pulse: 103 (03/16/17 0451)  Resp: 19 (03/16/17 0451)  BP: (!) 145/67 (03/16/17 0451)  SpO2: 98 % (03/16/17 0451) Vital Signs (24h Range):  Temp:  [97.2 °F (36.2 °C)-99.3 °F (37.4 °C)] 98.5 °F (36.9 °C)  Pulse:  [] 103  Resp:  [13-21] 19  SpO2:  [86 %-100 %] 98 %  BP: (114-182)/(57-89) 145/67     Weight: 51.7 kg (113 lb 15.7 oz)  Body mass index is 20.85 kg/(m^2).    Intake/Output Summary (Last 24 hours) at 03/16/17 0803  Last data filed at 03/16/17 0541   Gross per 24 hour   Intake              420 ml   Output             1450 ml   Net            -1030 ml      Physical Exam   Constitutional: No distress.   HENT:   Head: Normocephalic and atraumatic.   Eyes: Conjunctivae are normal. Right eye exhibits no discharge. Left eye exhibits no discharge.   Neck:   Multiple soft tissue masses palpated   Cardiovascular: Normal rate, regular rhythm and normal heart sounds.    Pulmonary/Chest: Effort normal and breath sounds normal.   Abdominal: Soft. Bowel sounds are normal.   Musculoskeletal: She exhibits no deformity.   Neurological:   Awakes to tactile stimuli  Does not respond to questions.   Skin: Skin is warm and dry.       Significant Labs:   BMP:     Recent Labs  Lab 03/15/17  0153   *      K 3.7      CO2 23   BUN 7*   CREATININE 0.7   CALCIUM 9.2     CBC:     Recent Labs  Lab 03/15/17  0153 03/16/17  0549   WBC 7.39 6.13   HGB 7.2* 8.2*   HCT 22.6* 25.5*    323

## 2017-03-16 NOTE — CONSULTS
Consult Note  Palliative Care      Consult Requested By: Valentina Ferris MD  Reason for Consult:      EOL/hospice    SUBJECTIVE:     History of Present Illness:      Principal Problem:Acute respiratory failure with hypoxia     Chief Complaint: Unresponsiveness intoday.     HPI: Mrs. Teodoro Whitehead is a 82 y.o. female [facility] resident with essential hypertension, type 2 diabetes mellitus (HbA1c unknown), anemia of chronic disease, and dementia who presents to [facility] ED with complaints of unresponsiveness tonight. She was found in this state at around 9:15 PM tonight. She was reportedly shallow breathing. EMS was activated and noted that her oxygen saturation on ambient air was 70% and that her heart rate was in the 140s. Further history is limited at this time.     Chart Review:  Patient has not had any recent hospitalizations or outpatient clinic visits within the system.    Patient was noted to have acute respiratory distress requiring intubation in the ED.  Patient was admitted to ICU with acute respiratory failure.  Consult was placed to pulmonary.      Of note, during admission CT chest and Neck show enlarge soft tissue swelling with lymphadenopathy in neck and metasatatic bone leseion in cervical area and possibly in lung. 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 Oncology in [facility] not recommending any therapy.    Palliative Care has been consulted to discuss EOL and hospice options.        Past Medical History:   Diagnosis Date    Cancer     Dementia     Diabetes mellitus     Hypertension     Thyroid disease      History reviewed. No pertinent surgical history.  History reviewed. No pertinent family history.  Social History   Substance Use Topics    Smoking status: Never Smoker    Smokeless tobacco: None    Alcohol use No       Mental Status:  Awake, almost non-verbal, but occasional unintelligible words (?in  "her native langugage).  Does not follow commands, but OVIEDO spontaneous. When asked what is her name - she repeated my name.      ECOG Performance Status Grade: 4 - Completely disabled      OBJECTIVE:     Pain Assessment/symptom management:  Assessment limited by patient's condition.  History of dementia, and ?speaks minimally in her native language.  She does not answer any questions, nor does she nod her head ye/no.  She does not follow commands.  No acute distress and appears comfortable.      Decision-Making Capacity:  History of dementia.  Family making decisions.  She does not appear to be able to speak on her own behalf.      Advanced Directives:  Living Will:  No.  Has a LaPOST dated 6/5/15.    Do Not Resuscitate Status:  DNR   Medical Power of :  Two sons are POA, Ariana Angulo ([phone]) and Manuel Angulo ([phone]) but there are no documents on file.  Requested they provide a copy for the chart.    Living Arrangements:  Nursing home.     Psychosocial, Spiritual, Cultural: Patient unable to answer complex questions.    Patient's most important priorities:  Patient's biggest concerns/fears:  Previous death/end of life care history:  Patient's goals/hopes:      ASSESSMENT/PLAN:     Patient lying supine, eyes open to name call.  She appears her stated age, and is quite thin/emaciated HT 5'2" WT 51.7 kg (113 lb 15.7 oz).  She remains awake and makes eye contact for a while, but is unable to state her own name when asked - but repeats my name.  She says a few unintelligible words (may her native language?).  She does not follow commands to squeeze hands or move feet, even when I hold her hand, but she does OVIEDO spontaneously.  She does not answer any questions.  No acute distress noted.    Respiratory effort even and unlabored.  Lungs diminished. Neck area has several very large nodular protuberances (mass? Nodes? - at base of anterior neck and on the left side of the neck).  Heart tones audible, " "regular.  Abdomen soft, nondistended, nontender, + bowel sounds.  No peripheral edema - she is quite thin/emaciated.  Vital signs are stable:  Afebrile axillary temp 99, , /58, RR 18, sat 96% on room air.      There is no family at bedside.  I spoke to son Ariana Angulo by phone and he states he is interested in hospice care or his mother, but  he will not be here until after 7pm.  He states his brother Manuel will be arriving from [place] later today.  I was able to speak to son Manuel, and he states he will not be here until after 7:30 p.m.  We briefly discussed update on her condition.  Provided some basic information about hospice, but he does not want to make any kind of decisions until he actually sees his mother.  He did ask about option of home hospice.  Explained I will leave literature for him at the bedside and that the weekend SW will meet with him tomorrow.  All questions were asked and answered to his satisfaction.  Emotional support provided.  Explained I will see patient again on Monday if she is still in the hospital.      Provided literature (left at bedside) "Hard Choices For [place] People", fact sheets on cpr, nutrition and beneficial dehydration at the end of life.      Plan:  Educate.  Literature.  Goals of care discussion.  Support.  Consult .      Recommendations:   Continue supportive care.   Would be good hospice candidate (if family chooses it)   Will need new LaPOST prior to discharge (to indicate change in code status and scope of care)   Consult Spiritual Care.   Palliative Care will follow as needed.    Thank you for this consult and the opportunity to participate in Mrs. Whitehead's care.      Swati Us, JAIMEN, RN, CCRN   Palliative Care Nurse Coordinator   [facility]  [phone]      Time Spent:   70 minutes     "

## 2017-03-16 NOTE — PT/OT/SLP EVAL
Physical Therapy  Evaluation    Teodoro Whitehead   MRN: [id number]   Admitting Diagnosis: Acute respiratory failure with hypoxia    PT Received On: 17  PT Start Time: 1524     PT Stop Time: 1535    PT Total Time (min): 11 min       Billable Minutes:  Evaluation  11    Diagnosis: Acute respiratory failure with hypoxia  Intubated 3/10/17  Extubated 3/15/17    Past Medical History:   Diagnosis Date    Cancer     Dementia     Diabetes mellitus     Hypertension     Thyroid disease       History reviewed. No pertinent surgical history.    Referring physician: Ulices  Date referred to PT: 3/15/17    General Precautions: Standard, fall, contact  Orthopedic Precautions: N/A   Braces: N/A       Patient History:  Lives With: facility resident (currently SNF at [facility])  Living Arrangements: extended care facility  Living Environment Comment: Per nurse at [facility] patient walks with 2 person hand held assist and she was currently SNF.   Equipment Currently Used at Home: wheelchair    Previous Level of Function:  Ambulation Skills: needs assist (2 person )  Transfer Skills: needs assist    Subjective:  Communicated with nurse Cassidy prior to session.  Chief Complaint: Patient did not speak during session.   Patient goals: Unable to verbalize.     Pain Ratin/10     Objective:   Patient found with: peripheral IV, ling catheter     Cognitive Exam:  Oriented to: patient unable to verbalize but did turn her head when name was called    Follows Commands/attention: Follows one-step commands ~25% of the time  Communication: unable  Safety awareness/insight to disability: impaired    Physical Exam:  Postural examination/scapula alignment: No postural abnormalities identified    Skin integrity: Visible skin intact  Edema: None noted     Lower Extremity Range of Motion:  Right Lower Extremity: WFL except limited in knee extension due to hamstring tightness  Left Lower Extremity: WFL except limited in knee extension due to  hamstring tightness    Lower Extremity Strength:  Right Lower Extremity: WFL  Left Lower Extremity: WFL     Gross motor coordination: impaired    Functional Mobility:  Bed Mobility:  Supine to Sit: Moderate Assistance  Sit to Supine: Moderate Assistance    Transfers:  Sit <> Stand Assistance: Moderate Assistance  Sit <> Stand Assistive Device: Other (see comments) (2 person HHA)    Gait:   Gait Distance: ~12ft around the bed with patient incontinent of bowel  Assistance 1: Maximum assistance  Gait Assistive Device: Hand held assist (2 person )  Gait Pattern: swing-to gait  Gait Deviation(s): decreased aristeo, increased time in double stance, decreased velocity of limb motion, decreased step length    Balance:   Static Sit: FAIR+: Able to take MINIMAL challenges from all directions  Dynamic Sit: FAIR: Cannot move trunk without losing balance  Static Stand: POOR: Needs MODERATE assist to maintain  Dynamic stand: 0: N/A    AM-PAC 6 CLICK MOBILITY  How much help from another person does this patient currently need?   1 = Unable, Total/Dependent Assistance  2 = A lot, Maximum/Moderate Assistance  3 = A little, Minimum/Contact Guard/Supervision  4 = None, Modified [place]/Independent    Turning over in bed (including adjusting bedclothes, sheets and blankets)?: 2  Sitting down on and standing up from a chair with arms (e.g., wheelchair, bedside commode, etc.): 2  Moving from lying on back to sitting on the side of the bed?: 2  Moving to and from a bed to a chair (including a wheelchair)?: 2  Need to walk in hospital room?: 2  Climbing 3-5 steps with a railing?: 1  Total Score: 11     AM-PAC Raw Score CMS G-Code Modifier Level of Impairment Assistance   6 % Total / Unable   7 - 9 CM 80 - 100% Maximal Assist   10 - 14 CL 60 - 80% Moderate Assist   15 - 19 CK 40 - 60% Moderate Assist   20 - 22 CJ 20 - 40% Minimal Assist   23 CI 1-20% SBA / CGA   24 CH 0% Independent/ Mod I     Patient left supine with all lines  intact, call button in reach and nurse notified.    Assessment:   Teodoro Whitehead is a 82 y.o. female with a medical diagnosis of Acute respiratory failure with hypoxia and presents with impaired balance for functional mobility. She would continue to benefit from PT while in the hospital in order to address deficits listed below and get patient back to PLOF.     Rehab identified problem list/impairments: Rehab identified problem list/impairments: weakness, impaired endurance, impaired functional mobilty, gait instability, impaired balance, impaired cognition, decreased lower extremity function, decreased safety awareness, impaired coordination, impaired joint extensibility, decreased ROM, impaired muscle length    Rehab potential is fair.    Activity tolerance: Fair    Discharge recommendations: Discharge Facility/Level Of Care Needs: nursing facility, skilled (was [place])     Barriers to discharge: Barriers to Discharge: None    Equipment recommendations: Equipment Needed After Discharge: none     GOALS:   Physical Therapy Goals        Problem: Physical Therapy Goal    Goal Priority Disciplines Outcome Goal Variances Interventions   Physical Therapy Goal     PT/OT, PT      Description:  Goals to be met by: 3/29/17    Patient will increase functional independence with mobility by performin. Supine to sit with Moderate Assistance  2. Sit to stand transfer with Moderate Assistance  3. Gait  x 100 feet with Moderate Assistance and rolling walker              PLAN:    Patient to be seen 3 x/week to address the above listed problems via gait training, therapeutic activities, therapeutic exercises  Plan of Care expires: 17  Plan of Care reviewed with: patient    Functional Assessment Tool Used: AM PAC   Score: 11  Functional Limitation: Mobility: Walking and moving around  Mobility: Walking and Moving Around Current Status (): CL  Mobility: Walking and Moving Around Goal Status  (): CRIS Randall, PT, MOT  03/16/2017

## 2017-03-16 NOTE — PROGRESS NOTES
[facility]  Adult Nutrition  Progress Note    SUMMARY     Recommendations    Recommendation/Intervention: 1) Continue Current Diet 2) Continue with total feeding assistance 3) If patient does not have lactose intolerance, recommend Boost Glucose Control TID 4) RD to monitor po intake  Goals: 1) Patient will consume >=75% of meals 2) Patient will tolerate oral diet 3) Supplement to be ordered if able to tolerate  Nutrition Goal Status: new  Communication of RD Recs: reviewed with RN    Continuum of Care Plan    D/C Planning:  Patient to discharge on a pureed diet with an oral supplement.     Reason for Assessment    Reason for Assessment: RD follow-up  Relevent Medical History: cancer, dementia, DM, HTN   General Information Comments: Patient extubated. SLP assessed. Diet advanced to Pureed with thin liquids. Patient is non-verbal. Appetite is fair with good tolerance per RN. Receiving feeding assistance. Spoke with RN regarding ordering an oral supplement. She believes she would benefit however she is hesistant secondary to diarrhea. Possibly due to antiobiotics. Waiting for family to visit to question whether or not she has a loctose intolerance. Palliative following. Spiritual following. +DNR    Nutrition Prescription Ordered    Current Diet Order: Pureed Diabetic 2200 calories     Current Nutrition Support Formula Ordered: Discontinued  Current Nutrition Support Rate Ordered: 45 (ml) (mls/hr)  Current Nutrition Support Frequency Ordered: continuous     Evaluation of Received Nutrients/Fluid Intake    Energy Calories Required: meeting needs  Protein Required: meeting needs  Fluid Required: not meeting needs (Net I/O -1030)    Tolerance: tolerating     Nutrition Risk Screen     Nutrition Risk Screen: no indicators present    Nutrition/Diet History    Patient Reported Diet/Restrictions/Preferences:  (GABBY)     Food Preferences: GABBY    Factors Affecting Nutritional Intake: decreased appetite,  difficulty/impaired swallowing, inability to feed self    Labs/Tests/Procedures/Meds    Diagnostic Test/Procedure Review: reviewed, pertinent  Pertinent Labs Reviewed: reviewed  Pertinent Labs Comments:  - 239  Lab Results   Component Value Date    ALBUMIN 3.3 (L) 03/11/2017       Pertinent Medications Reviewed: reviewed, pertinent  Scheduled Meds:   albuterol-ipratropium 2.5mg-0.5mg/3mL  3 mL Nebulization Q6H WAKE    amoxicillin-clavulanate 875-125mg  1 tablet Oral Q12H    aspirin  81 mg Oral Daily    chlorhexidine  15 mL Mouth/Throat BID    donepezil  10 mg Oral QHS    enoxaparin  40 mg Subcutaneous Daily    lisinopril  10 mg Oral Daily    pantoprazole  40 mg Intravenous Daily    vancomycin (VANCOCIN) IVPB  750 mg Intravenous Q24H     Continuous Infusions:   PRN Meds:.acetaminophen, dextrose 50%, dextrose 50%, glucagon (human recombinant), glucose, glucose, hydrALAZINE, insulin aspart, ondansetron, pneumoc 13-emma conj-dip cr(PF), promethazine (PHENERGAN) IVPB, ramelteon    Physical Findings    Overall Physical Appearance: loss of muscle mass  Tubes:  (-)  Oral/Mouth Cavity: bleeding, tooth/teeth missing  Skin: intact    Anthropometrics    Height Method: Stated  Height (inches): 62.01 in  Weight Method: Bed Scale  Weight (kg): 50.8 kg  Ideal Body Weight (IBW), Female: 110.05 lb  % Ideal Body Weight, Female (lb): 101.76 lb  BMI (kg/m2): 20.48  BMI Grade: 18.5-24.9 - normal    Estimated/Assessed Needs    Weight Used For Calorie Calculations: 51.7 kg (113 lb 15.7 oz)   Height (cm): 157.5 cm  Energy Need Method: Kcal/kg (1551 - 1809)  RMR ([place]-St. Jeor Equation): 937.27  Weight Used For Protein Calculations: 51.7 kg (113 lb 15.7 oz)  Protein Requirements: 64.6 - 77.6  Fluid Need Method: RDA Method, other (see comments) (or per MD)  Fiber Requirement: CHO requirement = 222 g/day     Malnutrition (undernutrition) Diagnosis  % Meal Intake:  50%    Nutrition Diagnosis  Nutrition Problem: Excessive  enteral nutrition infusion  Etiology: Formula/rate  Signs/Symptoms: Patient meeting 128.6% of estimated energy needs  Status: resolved (extubated, diet advanced)    Monitor and Evaluation    Food and Nutrient Intake: energy intake, food and beverage intake  Food and Nutrient Adminstration: diet order, other (specify) (supplement order)  Knowledge/Beliefs/Attitudes: beliefs and attitudes  Anthropometric Measurements: weight, weight change  Biochemical Data, Medical Tests and Procedures: electrolyte and renal panel, gastrointestinal profile, glucose/endocrine profile, lipid profile  Nutrition-Focused Physical Findings: overall appearance    Nutrition Risk    Level of Risk: high (2x/week)    Nutrition Follow-Up    RD Follow-up?: Yes    Assessment and Plan    No new Assessment & Plan notes have been filed under this hospital service since the last note was generated.  Service: Nutrition

## 2017-03-16 NOTE — PROGRESS NOTES
Pt confused. SIts up in bed and try to go to edge of  Bed. RN has requested an Avysis. None available at time. Charge states she will continue to monitor pts and provide as soon as available. Pt bed in lowest position, alarms set, room near station, night lights on.

## 2017-03-16 NOTE — PROGRESS NOTES
[facility] Hospital Medicine  Progress Note    Patient Name: Teodoro Whitehead  MRN: [id number]  Patient Class: IP- Inpatient   Admission Date: 3/10/2017  Length of Stay: 5 days  Attending Physician: Valentina Ferris MD  Primary Care Provider: Bandar Hernández MD        Subjective:     Principal Problem:Acute respiratory failure with hypoxia    HPI:  Mrs. Teodoro Whitehead is a 82 y.o. female [facility] resident with essential hypertension, type 2 diabetes mellitus (HbA1c unknown), anemia of chronic disease, and dementia who presents to [facility] ED with complaints of unresponsiveness tonight.  She was found in this state at around 9:15 PM tonight.  She was reportedly shallow breathing.  EMS was activated and noted that her oxygen saturation on ambient air was 70% and that her heart rate was in the 140s.  Further history is limited at this time.    Hospital Course:  Mrs. Teodoro Whitehead is a 82 y.o. female [facility] resident with essential hypertension, type 2 diabetes mellitus (HbA1c unknown), anemia of chronic disease, and dementia who presents to [facility] ED with complaints of unresponsiveness  She was reportedly shallow breathing.  EMS was activated and noted that her oxygen saturation on ambient air was 70% and that her heart rate was in the 140s. Patient has been intubated for acute hypoxic respiratory failure, pulmonology on vent management.  Likely due to aspiration. On IV Abx with Vanc for MRSA on sputum.  Also with Ecoli UTI. CT chest and Neck show enlarge soft tissue swelling with lymphadenopathy in neck and metasatatic bone leseion in cervical area and possibly in lung.  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 Oncology in [facility] not recommending any therapy.  Patient extubated on 3/14 and currently doing well on NC.  She was also made DNR prior to extubation after discussion with sons.      recommending pureed diet.patient is not talking and must be fed.      Interval History: Extubated and stable .but not talking,musy be fed.    Review of Systems   Unable to perform ROS: Dementia     Objective:     Vital Signs (Most Recent):  Temp: 98.5 °F (36.9 °C) (03/16/17 0451)  Pulse: 103 (03/16/17 0451)  Resp: 19 (03/16/17 0451)  BP: (!) 145/67 (03/16/17 0451)  SpO2: 98 % (03/16/17 0451) Vital Signs (24h Range):  Temp:  [97.2 °F (36.2 °C)-99.3 °F (37.4 °C)] 98.5 °F (36.9 °C)  Pulse:  [] 103  Resp:  [13-21] 19  SpO2:  [86 %-100 %] 98 %  BP: (114-182)/(57-89) 145/67     Weight: 51.7 kg (113 lb 15.7 oz)  Body mass index is 20.85 kg/(m^2).    Intake/Output Summary (Last 24 hours) at 03/16/17 0803  Last data filed at 03/16/17 0541   Gross per 24 hour   Intake              420 ml   Output             1450 ml   Net            -1030 ml      Physical Exam   Constitutional: No distress.   HENT:   Head: Normocephalic and atraumatic.   Eyes: Conjunctivae are normal. Right eye exhibits no discharge. Left eye exhibits no discharge.   Neck:   Multiple soft tissue masses palpated   Cardiovascular: Normal rate, regular rhythm and normal heart sounds.    Pulmonary/Chest: Effort normal and breath sounds normal.   Abdominal: Soft. Bowel sounds are normal.   Musculoskeletal: She exhibits no deformity.   Neurological:   Awakes to tactile stimuli  Does not respond to questions.   Skin: Skin is warm and dry.       Significant Labs:   BMP:     Recent Labs  Lab 03/15/17  0153   *      K 3.7      CO2 23   BUN 7*   CREATININE 0.7   CALCIUM 9.2     CBC:     Recent Labs  Lab 03/15/17  0153 03/16/17  0549   WBC 7.39 6.13   HGB 7.2* 8.2*   HCT 22.6* 25.5*    323           Assessment/Plan:      * Acute respiratory failure with hypoxia  The etiology of her respiratory failure may due to aspiration event.  She was intubated for airway protection and for hypoxia as she was with agonal respirations.    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 CT chest and neck shows malignancy with mets.  Unsure if neck lesions contributing to respiratory failure with possible aspiration.  Long discussion with patient's sons Ariana and Manuel.  Agree on DNR post extubation.  Patient extubated  And stable.  ST recommending pureed diet.  Aspiration precautions.  Will transfer to floor.  PT/OT evaluation. Consulted palliative care for hospice.    UTI (urinary tract infection)  Ecoli  On Zosyn.  Will change to PO Augmentin.    Essential hypertension  Will resume home Lisinopril at lower dose with parameters.    Type 2 diabetes mellitus  She is on a home regimen of metformin but will start  insulin sliding scale.  ST recommending pureed diet.  Start diabetic diet.      Dementia without behavioral disturbance  Will continue her home regimen of donepezil.  Per family it is severe.    Anemia of chronic disease  The patient's H/H is dropped  with no sign of active bleeding.  H/H low, but stable.  Will not transfuse at this time.    Pneumonia due to methicillin resistant Staphylococcus aureus  Pneumonia actually due to MRSA,   On sputum, started on Vanc.  Afebrile.  Doing well.        VTE Risk Mitigation         Ordered     enoxaparin injection 40 mg  Daily     Route:  Subcutaneous        03/11/17 0345     Medium Risk of VTE  Once      03/11/17 0345          Valentina Ferris MD  Department of Hospital Medicine   [facility]

## 2017-03-16 NOTE — PLAN OF CARE
Problem: Patient Care Overview  Goal: Plan of Care Review  Outcome: Ongoing (interventions implemented as appropriate)  Pt free from falls and injury. Pt still confused.

## 2017-03-16 NOTE — PLAN OF CARE
Problem: Patient Care Overview  Goal: Plan of Care Review  03/16/2017     Recommendations     Recommendation/Intervention: 1) Continue Current Diet 2) Continue with total feeding assistance 3) If patient does not have lactose intolerance, recommend Boost Glucose Control TID 4) RD to monitor po intake  Goals: 1) Patient will consume >=75% of meals 2) Patient will tolerate oral diet 3) Supplement to be ordered if able to tolerate  Nutrition Goal Status: new  Communication of RD Recs: reviewed with TRISHA Garcia, MPH, RD, LDN

## 2017-03-16 NOTE — PLAN OF CARE
03/16/17 1446   Medicare Message   Important Message from Medicare regarding Discharge Appeal Rights Other (comments)  (SW spoke to pt's son, Ariana [phone], to explain the IMM notice. Pt unable to speak or understand. Mr. Walls verbalized and understanding of the IMM notice. Notice to be mailed to [address] [id number])   Date IMM was signed 03/16/17   Time IMM was signed 0280

## 2017-03-16 NOTE — ASSESSMENT & PLAN NOTE
The etiology of her respiratory failure may due to aspiration event.  She was intubated for airway protection and for hypoxia as she was with agonal respirations.    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 CT chest and neck shows malignancy with mets.  Unsure if neck lesions contributing to respiratory failure with possible aspiration.  Long discussion with patient's sons Ariana and Manuel.  Agree on DNR post extubation.  Patient extubated  And stable.  ST recommending pureed diet.  Aspiration precautions.  Will transfer to floor.  PT/OT evaluation. Consulted palliative care for hospice.

## 2017-03-17 LAB
BASOPHILS # BLD AUTO: 0.05 K/UL
BASOPHILS NFR BLD: 1 %
DIFFERENTIAL METHOD: ABNORMAL
EOSINOPHIL # BLD AUTO: 0.2 K/UL
EOSINOPHIL NFR BLD: 4.7 %
ERYTHROCYTE [DISTWIDTH] IN BLOOD BY AUTOMATED COUNT: 15.3 %
HCT VFR BLD AUTO: 23.5 %
HGB BLD-MCNC: 7.5 G/DL
LYMPHOCYTES # BLD AUTO: 1.6 K/UL
LYMPHOCYTES NFR BLD: 33.3 %
MCH RBC QN AUTO: 25.1 PG
MCHC RBC AUTO-ENTMCNC: 31.9 %
MCV RBC AUTO: 79 FL
MONOCYTES # BLD AUTO: 0.6 K/UL
MONOCYTES NFR BLD: 12.9 %
NEUTROPHILS # BLD AUTO: 2.3 K/UL
NEUTROPHILS NFR BLD: 47.7 %
PLATELET # BLD AUTO: 321 K/UL
PMV BLD AUTO: 9.4 FL
POCT GLUCOSE: 181 MG/DL (ref 70–110)
POCT GLUCOSE: 206 MG/DL (ref 70–110)
POCT GLUCOSE: 212 MG/DL (ref 70–110)
POCT GLUCOSE: 247 MG/DL (ref 70–110)
RBC # BLD AUTO: 2.99 M/UL
VANCOMYCIN TROUGH SERPL-MCNC: 5.5 UG/ML
WBC # BLD AUTO: 4.89 K/UL

## 2017-03-17 PROCEDURE — G8997 SWALLOW GOAL STATUS: HCPCS | Mod: CI

## 2017-03-17 PROCEDURE — 94640 AIRWAY INHALATION TREATMENT: CPT

## 2017-03-17 PROCEDURE — 25000003 PHARM REV CODE 250: Performed by: HOSPITALIST

## 2017-03-17 PROCEDURE — C9113 INJ PANTOPRAZOLE SODIUM, VIA: HCPCS | Performed by: HOSPITALIST

## 2017-03-17 PROCEDURE — 11000001 HC ACUTE MED/SURG PRIVATE ROOM

## 2017-03-17 PROCEDURE — 80202 ASSAY OF VANCOMYCIN: CPT

## 2017-03-17 PROCEDURE — 92526 ORAL FUNCTION THERAPY: CPT

## 2017-03-17 PROCEDURE — 94761 N-INVAS EAR/PLS OXIMETRY MLT: CPT

## 2017-03-17 PROCEDURE — 63600175 PHARM REV CODE 636 W HCPCS: Performed by: HOSPITALIST

## 2017-03-17 PROCEDURE — 85025 COMPLETE CBC W/AUTO DIFF WBC: CPT

## 2017-03-17 PROCEDURE — G8998 SWALLOW D/C STATUS: HCPCS | Mod: CJ

## 2017-03-17 PROCEDURE — 25000003 PHARM REV CODE 250: Performed by: INTERNAL MEDICINE

## 2017-03-17 PROCEDURE — 25000242 PHARM REV CODE 250 ALT 637 W/ HCPCS: Performed by: HOSPITALIST

## 2017-03-17 PROCEDURE — 36415 COLL VENOUS BLD VENIPUNCTURE: CPT

## 2017-03-17 RX ORDER — PANTOPRAZOLE SODIUM 40 MG/1
40 TABLET, DELAYED RELEASE ORAL DAILY
Status: DISCONTINUED | OUTPATIENT
Start: 2017-03-17 | End: 2017-03-20 | Stop reason: HOSPADM

## 2017-03-17 RX ADMIN — CHLORHEXIDINE GLUCONATE 15 ML: 1.2 RINSE ORAL at 09:03

## 2017-03-17 RX ADMIN — ASPIRIN 81 MG: 81 TABLET, COATED ORAL at 09:03

## 2017-03-17 RX ADMIN — AMOXICILLIN AND CLAVULANATE POTASSIUM 1 TABLET: 875; 125 TABLET, FILM COATED ORAL at 09:03

## 2017-03-17 RX ADMIN — ACETAMINOPHEN 500 MG: 500 TABLET ORAL at 12:03

## 2017-03-17 RX ADMIN — INSULIN ASPART 2 UNITS: 100 INJECTION, SOLUTION INTRAVENOUS; SUBCUTANEOUS at 09:03

## 2017-03-17 RX ADMIN — INSULIN ASPART 2 UNITS: 100 INJECTION, SOLUTION INTRAVENOUS; SUBCUTANEOUS at 05:03

## 2017-03-17 RX ADMIN — IPRATROPIUM BROMIDE AND ALBUTEROL SULFATE 3 ML: .5; 3 SOLUTION RESPIRATORY (INHALATION) at 07:03

## 2017-03-17 RX ADMIN — INSULIN ASPART 2 UNITS: 100 INJECTION, SOLUTION INTRAVENOUS; SUBCUTANEOUS at 12:03

## 2017-03-17 RX ADMIN — PANTOPRAZOLE SODIUM 40 MG: 40 INJECTION, POWDER, FOR SOLUTION INTRAVENOUS at 09:03

## 2017-03-17 RX ADMIN — VANCOMYCIN HYDROCHLORIDE 750 MG: 750 INJECTION, POWDER, LYOPHILIZED, FOR SOLUTION INTRAVENOUS at 05:03

## 2017-03-17 RX ADMIN — DONEPEZIL HYDROCHLORIDE 10 MG: 10 TABLET, FILM COATED ORAL at 09:03

## 2017-03-17 RX ADMIN — IPRATROPIUM BROMIDE AND ALBUTEROL SULFATE 3 ML: .5; 3 SOLUTION RESPIRATORY (INHALATION) at 01:03

## 2017-03-17 RX ADMIN — RAMELTEON 8 MG: 8 TABLET, FILM COATED ORAL at 09:03

## 2017-03-17 NOTE — PROGRESS NOTES
[facility] Hospital Medicine  Progress Note    Patient Name: Teodoro Whitehead  MRN: [id number]  Patient Class: IP- Inpatient   Admission Date: 3/10/2017  Length of Stay: 6 days  Attending Physician: Valentina Ferris MD  Primary Care Provider: Bandar Hernández MD        Subjective:     Principal Problem:Acute respiratory failure with hypoxia    HPI:  Mrs. Teodoro Whitehead is a 82 y.o. female [facility] resident with essential hypertension, type 2 diabetes mellitus (HbA1c unknown), anemia of chronic disease, and dementia who presents to [facility] ED with complaints of unresponsiveness tonight.  She was found in this state at around 9:15 PM tonight.  She was reportedly shallow breathing.  EMS was activated and noted that her oxygen saturation on ambient air was 70% and that her heart rate was in the 140s.  Further history is limited at this time.    Hospital Course:  Mrs. Teodoro Whitehead is a 82 y.o. female [facility] resident with essential hypertension, type 2 diabetes mellitus (HbA1c unknown), anemia of chronic disease, and dementia who presents to [facility] ED with complaints of unresponsiveness  She was reportedly shallow breathing.  EMS was activated and noted that her oxygen saturation on ambient air was 70% and that her heart rate was in the 140s. Patient has been intubated for acute hypoxic respiratory failure, pulmonology on vent management.  Likely due to aspiration. On IV Abx with Vanc for MRSA on sputum.  Also with Ecoli UTI. CT chest and Neck show enlarge soft tissue swelling with lymphadenopathy in neck and metasatatic bone leseion in cervical area and possibly in lung.  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 Oncology in [facility] not recommending any therapy.  Patient extubated on 3/14 and currently doing well on NC.  She was also made DNR prior to extubation after discussion with sons.      recommending pureed diet.patient is talking only selectively,spoke with SON,want come to hospsital with his brother regarding possible Hospice care,consulted already palliative care.      Interval History: Extubated and stable,talk only selectively.    Review of Systems   Unable to perform ROS: Dementia     Objective:     Vital Signs (Most Recent):  Temp: 99 °F (37.2 °C) (03/17/17 0039)  Pulse: 95 (03/17/17 0039)  Resp: 18 (03/17/17 0039)  BP: (!) 142/65 (03/17/17 0039)  SpO2: 95 % (03/17/17 0039) Vital Signs (24h Range):  Temp:  [97.6 °F (36.4 °C)-99.4 °F (37.4 °C)] 99 °F (37.2 °C)  Pulse:  [] 95  Resp:  [17-20] 18  SpO2:  [94 %-99 %] 95 %  BP: (114-166)/(58-80) 142/65     Weight: 51.7 kg (113 lb 15.7 oz)  Body mass index is 20.85 kg/(m^2).    Intake/Output Summary (Last 24 hours) at 03/17/17 0543  Last data filed at 03/17/17 0500   Gross per 24 hour   Intake              780 ml   Output             1250 ml   Net             -470 ml      Physical Exam   Constitutional: No distress.   HENT:   Head: Normocephalic and atraumatic.   Eyes: Conjunctivae are normal. Right eye exhibits no discharge. Left eye exhibits no discharge.   Neck:   Multiple soft tissue masses palpated   Cardiovascular: Normal rate, regular rhythm and normal heart sounds.    Pulmonary/Chest: Effort normal and breath sounds normal.   Abdominal: Soft. Bowel sounds are normal.   Musculoskeletal: She exhibits no deformity.   Neurological:   Awakes to tactile stimuli  Does not respond to questions.   Skin: Skin is warm and dry.       Significant Labs:   BMP:   No results for input(s): GLU, NA, K, CL, CO2, BUN, CREATININE, CALCIUM, MG in the last 48 hours.  CBC:     Recent Labs  Lab 03/16/17  0549 03/17/17  0403   WBC 6.13 4.89   HGB 8.2* 7.5*   HCT 25.5* 23.5*    321           Assessment/Plan:      * Acute respiratory failure with hypoxia  The etiology of her respiratory failure may due to aspiration event.  She was intubated for airway  protection and for hypoxia as she was with agonal respirations.    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 CT chest and neck shows malignancy with mets.  Unsure if neck lesions contributing to respiratory failure with possible aspiration.  Long discussion with patient's sons Ariana and Manuel.  Agree on DNR post extubation.  Patient extubated  And stable.  ST recommending pureed diet.  Aspiration precautions.  Will transfer to floor.  PT/OT evaluation. Consulted palliative care for hospice.spoke with SON,he will come with his brother to discus possible hospice placement.    UTI (urinary tract infection)  Ecoli  On Zosyn.  Will change to PO Augmentin.    Essential hypertension  Will resume home Lisinopril at lower dose with parameters.    Type 2 diabetes mellitus  She is on a home regimen of metformin but will start  insulin sliding scale.  ST recommending pureed diet.  Start diabetic diet.      Dementia without behavioral disturbance  Will continue her home regimen of donepezil.  Per family it is severe.    Anemia of chronic disease  The patient's H/H is dropped  with no sign of active bleeding.  H/H low, but stable.  Will not transfuse at this time.    Neck malignant neoplasm  As above      Pneumonia due to methicillin resistant Staphylococcus aureus  Pneumonia actually due to MRSA,   On sputum, started on Vanc.  Afebrile.  Doing well.        VTE Risk Mitigation         Ordered     enoxaparin injection 40 mg  Daily     Route:  Subcutaneous        03/11/17 0345     Medium Risk of VTE  Once      03/11/17 0345          Valentina Ferris MD  Department of Hospital Medicine   [facility]

## 2017-03-17 NOTE — PT/OT/SLP PROGRESS
Speech Language Pathology      Teodoro Whitehead  MRN: [id number]    Patient not seen today secondary to lethargy, patient would not rouse enough for PO trials; Nursing notified; nursing stated patient required max oral care after PO intake due to oral residue; diet upgrade from puree not recommended at this time    . Will follow-up 3/17/17      Ainsley Santoro, CONSTANTINO-SLP

## 2017-03-17 NOTE — PROGRESS NOTES
PREET called [facility], in regards to hospice companies that contracted with nursing home. Stated that [facility], and [facility] are contracted with [facility].     PRETE called son Ariana who stated that he will not be available until 6pm. PREET provided information of hospice companies. Ariana stated that he will speak with brother in regards to hospice companies. PREET voiced understanding.

## 2017-03-17 NOTE — PROGRESS NOTES
Attempted to call Phyllis at [facility] with Admissions. Left message on voice mail to return call regarding pt's return to facility with hospice.

## 2017-03-17 NOTE — SUBJECTIVE & OBJECTIVE
Interval History: Extubated and stable,talk only selectively.    Review of Systems   Unable to perform ROS: Dementia     Objective:     Vital Signs (Most Recent):  Temp: 99 °F (37.2 °C) (03/17/17 0039)  Pulse: 95 (03/17/17 0039)  Resp: 18 (03/17/17 0039)  BP: (!) 142/65 (03/17/17 0039)  SpO2: 95 % (03/17/17 0039) Vital Signs (24h Range):  Temp:  [97.6 °F (36.4 °C)-99.4 °F (37.4 °C)] 99 °F (37.2 °C)  Pulse:  [] 95  Resp:  [17-20] 18  SpO2:  [94 %-99 %] 95 %  BP: (114-166)/(58-80) 142/65     Weight: 51.7 kg (113 lb 15.7 oz)  Body mass index is 20.85 kg/(m^2).    Intake/Output Summary (Last 24 hours) at 03/17/17 0543  Last data filed at 03/17/17 0500   Gross per 24 hour   Intake              780 ml   Output             1250 ml   Net             -470 ml      Physical Exam   Constitutional: No distress.   HENT:   Head: Normocephalic and atraumatic.   Eyes: Conjunctivae are normal. Right eye exhibits no discharge. Left eye exhibits no discharge.   Neck:   Multiple soft tissue masses palpated   Cardiovascular: Normal rate, regular rhythm and normal heart sounds.    Pulmonary/Chest: Effort normal and breath sounds normal.   Abdominal: Soft. Bowel sounds are normal.   Musculoskeletal: She exhibits no deformity.   Neurological:   Awakes to tactile stimuli  Does not respond to questions.   Skin: Skin is warm and dry.       Significant Labs:   BMP:   No results for input(s): GLU, NA, K, CL, CO2, BUN, CREATININE, CALCIUM, MG in the last 48 hours.  CBC:     Recent Labs  Lab 03/16/17  0549 03/17/17  0403   WBC 6.13 4.89   HGB 8.2* 7.5*   HCT 25.5* 23.5*    321

## 2017-03-17 NOTE — PROGRESS NOTES
RN request a avysis or sitter for pt. RN inform charge nurse. Charge states that no avysis or sitter is available. RN placed bed in lowest position with alarms set and room door open.

## 2017-03-17 NOTE — PROGRESS NOTES
Informed by PREET Pat that pt's son's, Manuel, flight will not arrive until 7pm tonight. Jessica informed Palliative nurse Viky of the above.

## 2017-03-17 NOTE — ASSESSMENT & PLAN NOTE
The etiology of her respiratory failure may due to aspiration event.  She was intubated for airway protection and for hypoxia as she was with agonal respirations.    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 CT chest and neck shows malignancy with mets.  Unsure if neck lesions contributing to respiratory failure with possible aspiration.  Long discussion with patient's sons Ariana and Manuel.  Agree on DNR post extubation.  Patient extubated  And stable.  ST recommending pureed diet.  Aspiration precautions.  Will transfer to floor.  PT/OT evaluation. Consulted palliative care for hospice.spoke with SON,he will come with his brother to discus possible hospice placement.

## 2017-03-17 NOTE — PLAN OF CARE
Problem: Fall Risk (Adult)  Goal: Identify Related Risk Factors and Signs and Symptoms  Related risk factors and signs and symptoms are identified upon initiation of Human Response Clinical Practice Guideline (CPG)   Outcome: Ongoing (interventions implemented as appropriate)  Pt confused, swallows well, free from falls and injury. No skin break down noted, ling in place and draining. Pt speaking more and need less redirection than previous shift.

## 2017-03-17 NOTE — PLAN OF CARE
Problem: SLP Goal  Goal: SLP Goal  Short Term Goals:   1. Pt will tolerate puree diet and thin liquids with no overt s/s of aspiration noted. MET  2. Pt will tolerate dental soft trials x5 for possible diet upgrade with no overt s/s of aspiration. Discontinued  Outcome: Outcome(s) achieved Date Met:  03/17/17  Pt seen to assess diet tolerance. Pt assessed with sips of water via cup and straw and 1/2 tsp of applesauce x3. No overt s/s aspiration with all consistencies trialed. Pt presents with munching pattern and bolus holding requiring a liquid wash to clear. Pt may benefit from a dietary consult for calorie count and possible nutritional supplement. Recommend puree diet thin liquids, alternate small bites and sips, and assistance with meals, strict aspiration precautions. No further skilled ST services warranted at this time. Please reconsult as needed. DERRICK Posey., CCC-SLP  Speech-Language Pathologist  03/17/2017

## 2017-03-17 NOTE — PROGRESS NOTES
Monitored freq.throughout the shift.pt.eresting at present with no acute distress noted. Pt.turned & repositioned freq. Report oncoming shift.

## 2017-03-18 LAB
BASOPHILS # BLD AUTO: 0.04 K/UL
BASOPHILS NFR BLD: 0.9 %
DIFFERENTIAL METHOD: ABNORMAL
EOSINOPHIL # BLD AUTO: 0.3 K/UL
EOSINOPHIL NFR BLD: 6.4 %
ERYTHROCYTE [DISTWIDTH] IN BLOOD BY AUTOMATED COUNT: 15.4 %
HCT VFR BLD AUTO: 23.8 %
HGB BLD-MCNC: 7.7 G/DL
LYMPHOCYTES # BLD AUTO: 1.7 K/UL
LYMPHOCYTES NFR BLD: 35.5 %
MCH RBC QN AUTO: 25.4 PG
MCHC RBC AUTO-ENTMCNC: 32.4 %
MCV RBC AUTO: 79 FL
MONOCYTES # BLD AUTO: 0.5 K/UL
MONOCYTES NFR BLD: 11.5 %
NEUTROPHILS # BLD AUTO: 2.1 K/UL
NEUTROPHILS NFR BLD: 45.7 %
PLATELET # BLD AUTO: 339 K/UL
PMV BLD AUTO: 8.7 FL
POCT GLUCOSE: 145 MG/DL (ref 70–110)
POCT GLUCOSE: 194 MG/DL (ref 70–110)
POCT GLUCOSE: 212 MG/DL (ref 70–110)
RBC # BLD AUTO: 3.03 M/UL
WBC # BLD AUTO: 4.68 K/UL

## 2017-03-18 PROCEDURE — 25000003 PHARM REV CODE 250: Performed by: INTERNAL MEDICINE

## 2017-03-18 PROCEDURE — 25000003 PHARM REV CODE 250: Performed by: HOSPITALIST

## 2017-03-18 PROCEDURE — 94761 N-INVAS EAR/PLS OXIMETRY MLT: CPT

## 2017-03-18 PROCEDURE — 63600175 PHARM REV CODE 636 W HCPCS: Performed by: INTERNAL MEDICINE

## 2017-03-18 PROCEDURE — 11000001 HC ACUTE MED/SURG PRIVATE ROOM

## 2017-03-18 PROCEDURE — 25000242 PHARM REV CODE 250 ALT 637 W/ HCPCS: Performed by: HOSPITALIST

## 2017-03-18 PROCEDURE — 85025 COMPLETE CBC W/AUTO DIFF WBC: CPT

## 2017-03-18 PROCEDURE — 94640 AIRWAY INHALATION TREATMENT: CPT

## 2017-03-18 PROCEDURE — 36415 COLL VENOUS BLD VENIPUNCTURE: CPT

## 2017-03-18 RX ADMIN — DONEPEZIL HYDROCHLORIDE 10 MG: 10 TABLET, FILM COATED ORAL at 08:03

## 2017-03-18 RX ADMIN — RAMELTEON 8 MG: 8 TABLET, FILM COATED ORAL at 08:03

## 2017-03-18 RX ADMIN — LISINOPRIL 10 MG: 5 TABLET ORAL at 09:03

## 2017-03-18 RX ADMIN — IPRATROPIUM BROMIDE AND ALBUTEROL SULFATE 3 ML: .5; 3 SOLUTION RESPIRATORY (INHALATION) at 12:03

## 2017-03-18 RX ADMIN — PROMETHAZINE HYDROCHLORIDE 12.5 MG: 25 INJECTION INTRAMUSCULAR; INTRAVENOUS at 11:03

## 2017-03-18 RX ADMIN — CHLORHEXIDINE GLUCONATE 15 ML: 1.2 RINSE ORAL at 09:03

## 2017-03-18 RX ADMIN — IPRATROPIUM BROMIDE AND ALBUTEROL SULFATE 3 ML: .5; 3 SOLUTION RESPIRATORY (INHALATION) at 10:03

## 2017-03-18 RX ADMIN — PANTOPRAZOLE SODIUM 40 MG: 40 TABLET, DELAYED RELEASE ORAL at 09:03

## 2017-03-18 RX ADMIN — INSULIN ASPART 2 UNITS: 100 INJECTION, SOLUTION INTRAVENOUS; SUBCUTANEOUS at 06:03

## 2017-03-18 RX ADMIN — ENOXAPARIN SODIUM 40 MG: 100 INJECTION SUBCUTANEOUS at 02:03

## 2017-03-18 RX ADMIN — AMOXICILLIN AND CLAVULANATE POTASSIUM 1 TABLET: 875; 125 TABLET, FILM COATED ORAL at 08:03

## 2017-03-18 RX ADMIN — IPRATROPIUM BROMIDE AND ALBUTEROL SULFATE 3 ML: .5; 3 SOLUTION RESPIRATORY (INHALATION) at 07:03

## 2017-03-18 RX ADMIN — ASPIRIN 81 MG: 81 TABLET, COATED ORAL at 09:03

## 2017-03-18 RX ADMIN — CHLORHEXIDINE GLUCONATE 15 ML: 1.2 RINSE ORAL at 08:03

## 2017-03-18 RX ADMIN — AMOXICILLIN AND CLAVULANATE POTASSIUM 1 TABLET: 875; 125 TABLET, FILM COATED ORAL at 09:03

## 2017-03-18 NOTE — PROGRESS NOTES
Monitored freq.throughout the shift. Pt.resting at present with no acute distress noted.  Pt. attemped to get out of bed & redirected. Bed alarm remains activated. Remains on contact isolation. Report oncoming shift.

## 2017-03-18 NOTE — PLAN OF CARE
Problem: Patient Care Overview  Goal: Plan of Care Review  Outcome: Ongoing (interventions implemented as appropriate)  Pt. Has bouts of confusion.  Denies pain. Meds tolerated.  Continual attempts to get out of bed, redirected. Safety maintained, will continue to monitor.

## 2017-03-18 NOTE — PLAN OF CARE
Problem: Patient Care Overview  Goal: Plan of Care Review  Outcome: Ongoing (interventions implemented as appropriate)  Continue with blood sugar checks ac/hs. Working on discharge plans to NH with hospice. Contact isolation continues for MRSA sputum and e-coli in urine. Whitehead patent and intact. Hard to understand at times. Patient is DNR.

## 2017-03-19 LAB
BASOPHILS # BLD AUTO: 0.05 K/UL
BASOPHILS NFR BLD: 1.1 %
DIFFERENTIAL METHOD: ABNORMAL
EOSINOPHIL # BLD AUTO: 0.3 K/UL
EOSINOPHIL NFR BLD: 7.4 %
ERYTHROCYTE [DISTWIDTH] IN BLOOD BY AUTOMATED COUNT: 15.5 %
HCT VFR BLD AUTO: 25.7 %
HGB BLD-MCNC: 8.3 G/DL
LYMPHOCYTES # BLD AUTO: 1.8 K/UL
LYMPHOCYTES NFR BLD: 39.6 %
MCH RBC QN AUTO: 25.7 PG
MCHC RBC AUTO-ENTMCNC: 32.3 %
MCV RBC AUTO: 80 FL
MONOCYTES # BLD AUTO: 0.6 K/UL
MONOCYTES NFR BLD: 13.2 %
NEUTROPHILS # BLD AUTO: 1.7 K/UL
NEUTROPHILS NFR BLD: 38.7 %
PLATELET # BLD AUTO: 384 K/UL
PMV BLD AUTO: 8.8 FL
POCT GLUCOSE: 137 MG/DL (ref 70–110)
POCT GLUCOSE: 156 MG/DL (ref 70–110)
POCT GLUCOSE: 191 MG/DL (ref 70–110)
POCT GLUCOSE: 253 MG/DL (ref 70–110)
POCT GLUCOSE: 355 MG/DL (ref 70–110)
RBC # BLD AUTO: 3.23 M/UL
WBC # BLD AUTO: 4.47 K/UL

## 2017-03-19 PROCEDURE — 94640 AIRWAY INHALATION TREATMENT: CPT

## 2017-03-19 PROCEDURE — 25000003 PHARM REV CODE 250: Performed by: HOSPITALIST

## 2017-03-19 PROCEDURE — 36415 COLL VENOUS BLD VENIPUNCTURE: CPT

## 2017-03-19 PROCEDURE — 25000242 PHARM REV CODE 250 ALT 637 W/ HCPCS: Performed by: HOSPITALIST

## 2017-03-19 PROCEDURE — 25000003 PHARM REV CODE 250: Performed by: INTERNAL MEDICINE

## 2017-03-19 PROCEDURE — 63600175 PHARM REV CODE 636 W HCPCS: Performed by: HOSPITALIST

## 2017-03-19 PROCEDURE — 85025 COMPLETE CBC W/AUTO DIFF WBC: CPT

## 2017-03-19 PROCEDURE — 11000001 HC ACUTE MED/SURG PRIVATE ROOM

## 2017-03-19 PROCEDURE — 94761 N-INVAS EAR/PLS OXIMETRY MLT: CPT

## 2017-03-19 RX ORDER — ENOXAPARIN SODIUM 100 MG/ML
40 INJECTION SUBCUTANEOUS EVERY 24 HOURS
Status: DISCONTINUED | OUTPATIENT
Start: 2017-03-19 | End: 2017-03-20 | Stop reason: HOSPADM

## 2017-03-19 RX ADMIN — PANTOPRAZOLE SODIUM 40 MG: 40 TABLET, DELAYED RELEASE ORAL at 09:03

## 2017-03-19 RX ADMIN — IPRATROPIUM BROMIDE AND ALBUTEROL SULFATE 3 ML: .5; 3 SOLUTION RESPIRATORY (INHALATION) at 01:03

## 2017-03-19 RX ADMIN — AMOXICILLIN AND CLAVULANATE POTASSIUM 1 TABLET: 875; 125 TABLET, FILM COATED ORAL at 09:03

## 2017-03-19 RX ADMIN — AMOXICILLIN AND CLAVULANATE POTASSIUM 1 TABLET: 875; 125 TABLET, FILM COATED ORAL at 08:03

## 2017-03-19 RX ADMIN — CHLORHEXIDINE GLUCONATE 15 ML: 1.2 RINSE ORAL at 08:03

## 2017-03-19 RX ADMIN — RAMELTEON 8 MG: 8 TABLET, FILM COATED ORAL at 08:03

## 2017-03-19 RX ADMIN — CHLORHEXIDINE GLUCONATE 15 ML: 1.2 RINSE ORAL at 09:03

## 2017-03-19 RX ADMIN — INSULIN ASPART 5 UNITS: 100 INJECTION, SOLUTION INTRAVENOUS; SUBCUTANEOUS at 12:03

## 2017-03-19 RX ADMIN — IPRATROPIUM BROMIDE AND ALBUTEROL SULFATE 3 ML: .5; 3 SOLUTION RESPIRATORY (INHALATION) at 06:03

## 2017-03-19 RX ADMIN — ENOXAPARIN SODIUM 40 MG: 100 INJECTION SUBCUTANEOUS at 06:03

## 2017-03-19 RX ADMIN — INSULIN ASPART 1 UNITS: 100 INJECTION, SOLUTION INTRAVENOUS; SUBCUTANEOUS at 09:03

## 2017-03-19 RX ADMIN — IPRATROPIUM BROMIDE AND ALBUTEROL SULFATE 3 ML: .5; 3 SOLUTION RESPIRATORY (INHALATION) at 07:03

## 2017-03-19 RX ADMIN — DONEPEZIL HYDROCHLORIDE 10 MG: 10 TABLET, FILM COATED ORAL at 08:03

## 2017-03-19 RX ADMIN — ASPIRIN 81 MG: 81 TABLET, COATED ORAL at 09:03

## 2017-03-19 NOTE — PLAN OF CARE
Problem: Infection, Risk/Actual (Adult)  Goal: Identify Related Risk Factors and Signs and Symptoms  Related risk factors and signs and symptoms are identified upon initiation of Human Response Clinical Practice Guideline (CPG)   Outcome: Ongoing (interventions implemented as appropriate)    03/19/17 0237   Infection, Risk/Actual   Related Risk Factors (Infection, Risk/Actual) age extremes;chronic illness/condition   Signs and Symptoms (Infection, Risk/Actual) blood glucose changes;weakness;feeding/eating intolerance         Problem: Fall Risk (Adult)  Goal: Identify Related Risk Factors and Signs and Symptoms  Related risk factors and signs and symptoms are identified upon initiation of Human Response Clinical Practice Guideline (CPG)   Outcome: Ongoing (interventions implemented as appropriate)    03/19/17 0237   Fall Risk   Related Risk Factors (Fall Risk) age-related changes;neuro disease/injury;polypharmacy;environment unfamiliar;gait/mobility problems;confusion/agitation   Signs and Symptoms (Fall Risk) presence of risk factors       Goal: Absence of Falls  Patient will demonstrate the desired outcomes by discharge/transition of care.     03/19/17 0237   Fall Risk (Adult)   Absence of Falls making progress toward outcome         Problem: Pressure Ulcer Risk (Burke Scale) (Adult,Obstetrics,Pediatric)  Goal: Identify Related Risk Factors and Signs and Symptoms  Related risk factors and signs and symptoms are identified upon initiation of Human Response Clinical Practice Guideline (CPG)   Outcome: Ongoing (interventions implemented as appropriate)    03/19/17 0237   Pressure Ulcer Risk (Burke Scale)   Related Risk Factors (Pressure Ulcer Risk (Burke Scale)) age extremes;cognitive impairment;mobility impaired;mental impairment;infection       Goal: Skin Integrity  Patient will demonstrate the desired outcomes by discharge/transition of care.   Outcome: Ongoing (interventions implemented as appropriate)     03/19/17 0237   Pressure Ulcer Risk (Burke Scale) (Adult,Obstetrics,Pediatric)   Skin Integrity making progress toward outcome

## 2017-03-19 NOTE — PLAN OF CARE
Problem: Patient Care Overview  Goal: Plan of Care Review  Outcome: Ongoing (interventions implemented as appropriate)  Continue with accuchecks ac/hs. Follow aspiration precautions. Patient has started feeding herself today. Ate very well. She is DNR and palliative care is following. Whitehead cath. Patent and intact. Turn q 2 hours. Bed alarm on as patient does try to get up unassisted. Remains on contact isolation.

## 2017-03-19 NOTE — PROGRESS NOTES
The pt was received on RA with a sat of 100%. No distress was noted at this time. Her breath sounds are diminished, coarse. She tolerated her treatment with RAMONA.

## 2017-03-19 NOTE — PROGRESS NOTES
Patient started feeding self for breakfast and ate 100%. Appears stronger today and tries to get out of bed alone. Bed alarm on.

## 2017-03-20 VITALS
RESPIRATION RATE: 19 BRPM | WEIGHT: 114 LBS | SYSTOLIC BLOOD PRESSURE: 118 MMHG | TEMPERATURE: 98 F | HEART RATE: 69 BPM | BODY MASS INDEX: 20.98 KG/M2 | HEIGHT: 62 IN | OXYGEN SATURATION: 98 % | DIASTOLIC BLOOD PRESSURE: 58 MMHG

## 2017-03-20 PROBLEM — N30.00 ACUTE CYSTITIS WITHOUT HEMATURIA: Status: ACTIVE | Noted: 2017-03-11

## 2017-03-20 PROBLEM — J96.11 CHRONIC RESPIRATORY FAILURE WITH HYPOXIA: Status: ACTIVE | Noted: 2017-03-11

## 2017-03-20 LAB
BASOPHILS # BLD AUTO: 0.05 K/UL
BASOPHILS NFR BLD: 1.2 %
DIFFERENTIAL METHOD: ABNORMAL
EOSINOPHIL # BLD AUTO: 0.5 K/UL
EOSINOPHIL NFR BLD: 12.4 %
ERYTHROCYTE [DISTWIDTH] IN BLOOD BY AUTOMATED COUNT: 15.3 %
HCT VFR BLD AUTO: 25.1 %
HGB BLD-MCNC: 8 G/DL
LYMPHOCYTES # BLD AUTO: 1.3 K/UL
LYMPHOCYTES NFR BLD: 31 %
MCH RBC QN AUTO: 25.1 PG
MCHC RBC AUTO-ENTMCNC: 31.9 %
MCV RBC AUTO: 79 FL
MONOCYTES # BLD AUTO: 0.5 K/UL
MONOCYTES NFR BLD: 11.9 %
NEUTROPHILS # BLD AUTO: 1.8 K/UL
NEUTROPHILS NFR BLD: 43.3 %
PLATELET # BLD AUTO: 415 K/UL
PMV BLD AUTO: 8.9 FL
POCT GLUCOSE: 134 MG/DL (ref 70–110)
POCT GLUCOSE: 205 MG/DL (ref 70–110)
POCT GLUCOSE: 247 MG/DL (ref 70–110)
RBC # BLD AUTO: 3.19 M/UL
WBC # BLD AUTO: 4.19 K/UL

## 2017-03-20 PROCEDURE — 94640 AIRWAY INHALATION TREATMENT: CPT

## 2017-03-20 PROCEDURE — 25000003 PHARM REV CODE 250: Performed by: HOSPITALIST

## 2017-03-20 PROCEDURE — 25000003 PHARM REV CODE 250: Performed by: INTERNAL MEDICINE

## 2017-03-20 PROCEDURE — 25000242 PHARM REV CODE 250 ALT 637 W/ HCPCS: Performed by: HOSPITALIST

## 2017-03-20 PROCEDURE — 36415 COLL VENOUS BLD VENIPUNCTURE: CPT

## 2017-03-20 PROCEDURE — G8989 SELF CARE D/C STATUS: HCPCS | Mod: CL

## 2017-03-20 PROCEDURE — 85025 COMPLETE CBC W/AUTO DIFF WBC: CPT

## 2017-03-20 RX ORDER — AMOXICILLIN AND CLAVULANATE POTASSIUM 875; 125 MG/1; MG/1
1 TABLET, FILM COATED ORAL EVERY 12 HOURS
Qty: 14 TABLET | Refills: 0 | Status: SHIPPED | OUTPATIENT
Start: 2017-03-20 | End: 2017-03-20

## 2017-03-20 RX ORDER — AMOXICILLIN AND CLAVULANATE POTASSIUM 875; 125 MG/1; MG/1
1 TABLET, FILM COATED ORAL EVERY 12 HOURS
Qty: 14 TABLET | Refills: 0 | Status: SHIPPED | OUTPATIENT
Start: 2017-03-20

## 2017-03-20 RX ORDER — LISINOPRIL 10 MG/1
10 TABLET ORAL DAILY
Qty: 30 TABLET | Refills: 3 | Status: SHIPPED | OUTPATIENT
Start: 2017-03-20 | End: 2018-03-20

## 2017-03-20 RX ADMIN — INSULIN ASPART 2 UNITS: 100 INJECTION, SOLUTION INTRAVENOUS; SUBCUTANEOUS at 05:03

## 2017-03-20 RX ADMIN — CHLORHEXIDINE GLUCONATE 15 ML: 1.2 RINSE ORAL at 10:03

## 2017-03-20 RX ADMIN — INSULIN ASPART 2 UNITS: 100 INJECTION, SOLUTION INTRAVENOUS; SUBCUTANEOUS at 12:03

## 2017-03-20 RX ADMIN — IPRATROPIUM BROMIDE AND ALBUTEROL SULFATE 3 ML: .5; 3 SOLUTION RESPIRATORY (INHALATION) at 07:03

## 2017-03-20 RX ADMIN — PANTOPRAZOLE SODIUM 40 MG: 40 TABLET, DELAYED RELEASE ORAL at 10:03

## 2017-03-20 RX ADMIN — AMOXICILLIN AND CLAVULANATE POTASSIUM 1 TABLET: 875; 125 TABLET, FILM COATED ORAL at 10:03

## 2017-03-20 RX ADMIN — ASPIRIN 81 MG: 81 TABLET, COATED ORAL at 10:03

## 2017-03-20 NOTE — PROGRESS NOTES
TN met with pt's son, Mr. Jackson to discuss d/c planning and request by pt's son to meet with CM. Mr. Jackson asked to speak with Dr. Martínez. Explanation of process regarding returning to [facility]. IMM completed. Mr. Jackson verbalized an understanding. Connsulted Dr. Martínez regarding pt will be transferred by to [facility] and hospice is on hold due to pt's sons discussing and agreeing on hospice.    Clinicals were not able to be sent via [facility]; sent via fax to [phone]; Face sheet; labs, MAR; imaging, facility orders; H&P; and consults. Call from Lucio Weber stating she received clinicals and needs stop date on Augmentin. Refaxed Augmentin order with stop date. Awaiting call report information.    Per son's request of CD per Radiology; spoke with Dr. Martínez regarding consent to give to pt's son, Mr. Jackson. TN spoke with Gail in radiology who provided the CD. TN presented to Mr. Jackson. TN informed pt is to be transferred back to [facility].    TN called [facility] ambulance for transport @ [phone];  Spoke with Kezia and provided requested information.  Ambulance will arrive within 1.5 hours. NurseEva notified.

## 2017-03-20 NOTE — PLAN OF CARE
03/20/17 1648   Final Note   Assessment Type Final Discharge Note   Discharge Disposition care home [facility]   Discharge planning education complete? Yes   What phone number can be called within the next 1-3 days to see how you are doing after discharge? (Listed in chart.)   Hospital Follow Up  Appt(s) scheduled? No   Discharge plans and expectations educations in teach back method with documentation complete? Yes  (Per pt's son, Mr. Jackson.)   [facility] Bedside Delivery? n/a   Discharge/Hospital Encounter Summary to [facility] PCP n/a   Referral to Outpatient Case Management complete? n/a   Referral to / orders for Home Health Complete? n/a   30 day supply of medicines given at discharge, if documented non-compliance / non-adherence? n/a   Any social issues identified prior to discharge? No   Did you assess the readiness or willingness of the family or caregiver to support self management of care? Yes   Right Care Referral Info   Post Acute Recommendation No Care

## 2017-03-20 NOTE — PLAN OF CARE
03/20/17 1647   Medicare Message   Important Message from Medicare regarding Discharge Appeal Rights Given to patient/caregiver;Explained to patient/caregiver;Signed/date by patient/caregiver   Date IMM was signed 03/20/17   Time IMM was signed 1250   Pt's son, Mr Jackson signed IMM.

## 2017-03-20 NOTE — PROGRESS NOTES
[facility] ambulance at bedside. Pt.transfered back to [facility].home via stretcher.

## 2017-03-20 NOTE — PROGRESS NOTES
PALLIATIVE CARE PROGRESS NOTE:    Discussed in detail with Dr. Martínez.  Patient will be discharged back to NH today, likely with hospice care.      I reviewed the POA form (original was written in [place]er) that is dated 2/8/14, signed by the patient and was notarized in [place].  However, it does not have 2 witnesses.  It does appoint patient's 4 sons as the agents for her business affairs - but it does NOT mention health care decisions.  Therefore, this is NOT A HCPOA.  Family will need to make decisions together, with majority needed to be in agreement.      Swati Us, JAIMEN, RN, CCRN   Palliative Care Nurse Coordinator   [facility]  [phone]

## 2017-03-20 NOTE — PLAN OF CARE
Facility Transfer Orders                        03/20/2017    Admit to: Nursing Home     Diagnoses:  Active Hospital Problems    Diagnosis  POA    *Chronic respiratory failure with hypoxia [J96.11]  Yes    Neck malignant neoplasm [C76.0]  Yes    Pneumonia due to methicillin resistant Staphylococcus aureus [J15.212]  Yes    Acute cystitis without hematuria [N30.00]  Yes    Essential hypertension [I10]  Yes     Chronic    Type 2 diabetes mellitus [E11.9]  Yes     Chronic    Dementia without behavioral disturbance [F03.90]  Yes    Anemia of chronic disease [D63.8]  Yes     Chronic      Resolved Hospital Problems    Diagnosis Date Resolved POA    On mechanically assisted ventilation [Z99.11] 03/15/2017 Not Applicable       Allergies:Review of patient's allergies indicates:  No Known Allergies    Vitals: Q month      Diet: Pureed     Supplement:  1 can every three times a day with meals                         Type:      Glucerna     Activity:      - Up in a chair each morning as tolerated      Nursing Precautions:    - Aspiration precautions:             - Total assistance with meals            -  Upright 90 degrees befor during and after meals             -  Suction at bedside          - Fall precautions     - Decubitus precautions:        -  for positioning   - Pressure reducing foam mattress   - Turn patient every two hours. Use wedge pillows to anchor patient    CONSULTS:      Nutrition to evaluate and recommend diet      LABS:  None      DIABETES CARE:      Check blood sugar:   QAM      Report CBG < 60 or > 400 to physician.                                          Insulin Sliding Scale          Glucose  Novolog Insulin Subcutaneous        0 - 60   Orange juice or glucose tablet, hold insulin      No insulin   201-250  2 units   251-300  4 units   301-350  6 units   351-400  8 units   >400   10 units then call physician      Medications: Discontinue all previous medication orders, if any.  See new list below.       Teodoro Whitehead   Home Medication Instructions TERRY:[id number]    Printed on:03/20/17 3495   Medication Information                      amoxicillin-clavulanate 875-125mg (AUGMENTIN) 875-125 mg per tablet  Take 1 tablet by mouth every 12 (twelve) hours for    7 days then STOP           aspirin (ECOTRIN) 81 MG EC tablet  Take 81 mg by mouth once daily.             donepezil (ARICEPT) 10 MG tablet  Take 10 mg by mouth every evening.             lisinopril 10 MG tablet  Take 1 tablet (10 mg total) by mouth once daily.             metformin (GLUCOPHAGE) 500 MG tablet  Take 500 mg by mouth 2 (two) times daily with meals.             mirtazapine (REMERON) 15 MG tablet  Take 15 mg by mouth every evening.                       _________________________________  Josefa Martínez MD  03/20/2017

## 2017-03-20 NOTE — PROGRESS NOTES
Pt. To transfer back to [facility].home. Report called to SLIM Brown. Saline lock d/c. Awaiting  [facility] ambulance for asst.with transfer.

## 2017-03-20 NOTE — PROGRESS NOTES
[facility]  Adult Nutrition  Consult Note    SUMMARY     Recommendations    Recommendation/Intervention:   1. Monitor weight weekly   2. If BG levels problematic rec change to pureed/ 1800 ADA diet restriction    Goals: 1) Patient will consume >=75% of meals 2) Patient will tolerate oral diet 3) Supplement to be ordered if able to tolerate  Nutrition Goal Status: progressing towards goal  Communication of RD Recs: reviewed with RN    Continuum of Care Plan    Referral to Outpatient Services:  (discharge plan: pureed/ADA diet with supplements as needed)    Reason for Assessment    Reason for Assessment: RD follow-up     Relevent Medical History: cancer, dementia, DM, HTN   Interdisciplinary Rounds: did not attend     General Information Comments: Diet per SLP: pureed, thin liquids. Tolerating pureed diet: eating % meals. Patient feeding self.    Nutrition Prescription Ordered    Current Diet Order: Pureed Diabetic 2200 calories       Evaluation of Received Nutrients/Fluid Intake      % Intake of Estimated needs: %  % Intake of meals: %       Nutrition Risk Screen     Nutrition Risk Screen: no indicators present    Nutrition/Diet History    Patient Reported Diet/Restrictions/Preferences:  (GABBY)     Food Preferences: GABBY      Factors Affecting Nutritional Intake: decreased appetite, difficulty/impaired swallowing     Labs/Tests/Procedures/Meds    Diagnostic Test/Procedure Review: reviewed, pertinent  Pertinent Labs Reviewed: reviewed  Pertinent Labs Comments:  - 239  Pertinent Medications Reviewed: reviewed, pertinent     Physical Findings    Overall Physical Appearance: loss of muscle mass  Tubes:  (-)  Oral/Mouth Cavity: bleeding, tooth/teeth missing  Skin: intact    Anthropometrics    Height Method: Stated  Height (inches): 62.01 in  Weight Method: Bed Scale  Weight (kg): 50.8 kg     Ideal Body Weight (IBW), Female: 110.05 lb     % Ideal Body Weight, Female (lb): 101.76  lb  BMI (kg/m2): 20.48  BMI Grade: 18.5-24.9 - normal     Estimated/Assessed Needs    Weight Used For Calorie Calculations: 51.7 kg (113 lb 15.7 oz)   Height (cm): 157.5 cm     Energy Need Method: Kcal/kg (1551 - 1809)     RMR ([place]-St. Jeor Equation): 937.27      Weight Used For Protein Calculations: 51.7 kg (113 lb 15.7 oz)  Protein Requirements: 64.6 - 77.6    Fluid Need Method: RDA Method, other (see comments) (or per MD)       Fiber Requirement: CHO requirement = 222 g/day     Nutrition Diagnosis     Problem: Difficulty Swallowing  Etiology: Dysphagia  As Evidenced by: SLP rec of pureed diet  Nutrition Diagnosis Status: Continues    Monitor and Evaluation    Food and Nutrient Intake: energy intake, food and beverage intake  Food and Nutrient Adminstration: diet order, other (specify) (supplement order)  Knowledge/Beliefs/Attitudes: beliefs and attitudes  Anthropometric Measurements: weight, weight change  Biochemical Data, Medical Tests and Procedures: electrolyte and renal panel, gastrointestinal profile, glucose/endocrine profile, lipid profile  Nutrition-Focused Physical Findings: overall appearance    Nutrition Risk    Level of Risk:  (1 x week)    Nutrition Follow-Up    RD Follow-up?: Yes    Assessment and Plan    No new Assessment & Plan notes have been filed under this hospital service since the last note was generated.  Service: Nutrition

## 2017-03-21 NOTE — PT/OT/SLP DISCHARGE
Physical Therapy Discharge Summary    Teodoro Whitehead  MRN: [id number]   Chronic respiratory failure with hypoxia   Patient Discharged from acute Physical Therapy on 3/20/17.  Please refer to prior PT noted date on 3/16/17 for functional status.     Assessment:   Patient was discharge unexpectedly.  Information required to complete and accurate discharge summary is unknown.  Refer to therapy initial evaluation and last progress note for initial and most recent functional status and goal achievement.  Recommendations made may be found in medical record.  GOALS:   Physical Therapy Goals        Problem: Physical Therapy Goal    Goal Priority Disciplines Outcome Goal Variances Interventions   Physical Therapy Goal     PT/OT, PT Unable to achieve outcome(s) by discharge     Description:  Goals to be met by: 3/29/17    Patient will increase functional independence with mobility by performin. Supine to sit with Moderate Assistance  2. Sit to stand transfer with Moderate Assistance  3. Gait  x 100 feet with Moderate Assistance and rolling walker              Reasons for Discontinuation of Therapy Services  Transfer to alternate level of care.      Plan:  Patient Discharged to: [facility] nursing home.

## 2017-03-23 NOTE — DISCHARGE SUMMARY
[facility] Hospital Medicine  Discharge Summary      Patient Name: Teodoro Whitehead  MRN: [id number]  Admission Date: 3/10/2017  Hospital Length of Stay: 9 days  Discharge Date and Time: 3/20/2017  7:01 PM  Attending Physician: Josefa Martínez MD   Discharging Provider: Josefa Martínez MD  Primary Care Provider: Bandar Hernández MD      HPI:   Mrs. Teodoro Whitehead is a 82 y.o. female [facility] resident with essential hypertension, type 2 diabetes mellitus (HbA1c unknown), anemia of chronic disease, and dementia who presents to [facility] ED with complaints of unresponsiveness tonight.  She was found in this state at around 9:15 PM tonight.  She was reportedly shallow breathing.  EMS was activated and noted that her oxygen saturation on ambient air was 70% and that her heart rate was in the 140s.  Further history is limited at this time.    * No surgery found *      Indwelling Lines/Drains at time of discharge:   Lines/Drains/Airways          No matching active lines, drains, or airways        Hospital Course:   Mrs. Teodoro Whitehead is a 82 y.o. female [facility] resident with essential hypertension, type 2 diabetes mellitus (HbA1c unknown), anemia of chronic disease, and dementia who presents to [facility] ED with complaints of unresponsiveness  She was reportedly shallow breathing.  EMS was activated and noted that her oxygen saturation on ambient air was 70% and that her heart rate was in the 140s. Patient has been intubated for acute hypoxic respiratory failure, pulmonology on vent management.  Likely due to aspiration. On IV Abx with Vanc for MRSA on sputum.  Also with Ecoli UTI and will have an additional 7 days of antibiotics to complete therapy.  CT chest and Neck show enlarge soft tissue swelling with lymphadenopathy in neck and metasatatic bone leseion in cervical area and possibly in lung.  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 Oncology in [place] not recommending any therapy.  Patient extubated on 3/14 and currently doing well on NC.  She was also made DNR prior to extubation after discussion with sons.    ST recommending pureed diet.  The patient had a son to arrive from [place] and he was not comfortable with DNR or Hospice status.  The patient will return to NH with prison care and then her sons with reach a consensus on her goals of care.  Her other two sons were ready to pursue just comfort measures.         Consults:   Consults         Status Ordering Provider     Inpatient consult to Palliative Care  Once     Provider:  Swati Us RN    Completed LYLY ARMANDO     Inpatient consult to Pulmonology  Once     Provider:  Marty Draper MD    Completed RICH PATEL consult to dietary  Once     Provider:  (Not yet assigned)    Completed GARRETT ADDISON        Microbiology:   Urine Culture    Lab Results   Component Value Date    LABURIN ESCHERICHIA COLI  > 100,000 cfu/ml   03/10/2017       Pending Diagnostic Studies:     None        Final Active Diagnoses:    Diagnosis Date Noted POA    PRINCIPAL PROBLEM:  Chronic respiratory failure with hypoxia [J96.11] 03/11/2017 Yes    Neck malignant neoplasm [C76.0] 03/13/2017 Yes    Pneumonia due to methicillin resistant Staphylococcus aureus [J15.212] 03/13/2017 Yes    Acute cystitis without hematuria [N30.00] 03/11/2017 Yes    Essential hypertension [I10] 03/11/2017 Yes     Chronic    Type 2 diabetes mellitus [E11.9] 03/11/2017 Yes     Chronic    Dementia without behavioral disturbance [F03.90] 03/11/2017 Yes    Anemia of chronic disease [D63.8] 03/11/2017 Yes     Chronic      Problems Resolved During this Admission:    Diagnosis Date Noted Date Resolved POA    On mechanically assisted ventilation [Z99.11] 03/11/2017 03/15/2017 Not Applicable      No new Assessment & Plan notes have been filed under this hospital service  since the last note was generated.  Service: Hospital Medicine      Discharged Condition: fair    Disposition: Hospice/Medical Facility    Follow Up:  Follow-up Information     Follow up with Bandar Hernández MD.    Specialty:  Geriatric Medicine    Contact information:    [address] [id number] [phone]          Patient Instructions:     Diet general   Order Specific Question Answer Comments   Additional restrictions: Pureed      Activity as tolerated       Medications:  Reconciled Home Medications:   Discharge Medication List as of 3/20/2017  6:16 PM      CONTINUE these medications which have CHANGED    Details   amoxicillin-clavulanate 875-125mg (AUGMENTIN) 875-125 mg per tablet Take 1 tablet by mouth every 12 (twelve) hours., Starting 3/20/2017, Until Discontinued, Print      lisinopril 10 MG tablet Take 1 tablet (10 mg total) by mouth once daily., Starting 3/20/2017, Until Tue 3/20/18, Print         CONTINUE these medications which have NOT CHANGED    Details   aspirin (ECOTRIN) 81 MG EC tablet Take 81 mg by mouth once daily., Until Discontinued, Historical Med      donepezil (ARICEPT) 10 MG tablet Take 10 mg by mouth every evening., Until Discontinued, Historical Med      metformin (GLUCOPHAGE) 500 MG tablet Take 500 mg by mouth 2 (two) times daily with meals., Until Discontinued, Historical Med      mirtazapine (REMERON) 15 MG tablet Take 15 mg by mouth every evening., Until Discontinued, Historical Med           Time spent on the discharge of patient: 40 minutes    Josefa Martínez MD  Department of Hospital Medicine  [facility]

## 2019-10-09 NOTE — ASSESSMENT & PLAN NOTE
She is on a home regimen of metformin but will start basal insulin therapy along with insulin sliding scale.   ambulatory

## 2024-01-11 NOTE — PT/OT/SLP PROGRESS
"Speech Language Pathology  Dyspahgia Treatment  Discharge    Teodoro Whitehead   MRN: [id number]   [id number]    Admitting Diagnosis: Acute respiratory failure with hypoxia    Diet recommendations: Solid Diet Level: Puree  Liquid Diet Level: Thin Small bites/sips, Alternating bites/sips, Check for pocketing/oral residue, Meds whole 1 at a time and Assistance with meals    SLP Treatment Date: 03/17/17  Speech Start Time: 1135     Speech Stop Time: 1145     Speech Total (min): 10 min       TREATMENT BILLABLE MINUTES:  Treatment Swallowing Dysfunction 10    Has the patient been evaluated by SLP for swallowing? : Yes  Keep patient NPO?: No   General Precautions: Standard, fall, aspiration, pureed diet  Current Respiratory Status:  (room air)       Subjective:  Pt repositioned HOB elevated. Pt in retracted position during session. Pt vocalized "ah" upon command to assess vocal quality.       Objective:      Pt seen to assess diet tolerance. Pt assessed with sips of water via cup and straw and 1/2 tsp of applesauce x3. No overt s/s aspiration with all consistencies trialed. Pt presents with munching pattern and bolus holding requiring a liquid wash to clear. Pt may benefit from a dietary consult for calorie count and possible nutritional supplement. Recommend puree diet thin liquids, alternate small bites and sips, and assistance with meals, strict aspiration precautions. No further skilled ST services warranted at this time. Please reconsult as needed.    Assessment:  Teodoro Whitehead is a 82 y.o. female with a medical diagnosis of Acute respiratory failure with hypoxia and is tolerating puree diet, thin liquids. Pt presents with bolus holding. No further skilled ST services warranted at this time. Please reconsult as needed.    Discharge recommendations: Discharge Facility/Level Of Care Needs:  (no further skilled ST services warranted)     Goals:   SLP Goals     Not on file      Multidisciplinary Problems (Resolved)     " What Type Of Note Output Would You Prefer (Optional)?: Standard Output    Problem: SLP Goal    Goal Priority Disciplines Outcome   SLP Goal   (Resolved)     SLP Outcome(s) achieved   Description:  Short Term Goals:   1. Pt will tolerate puree diet and thin liquids with no overt s/s of aspiration noted. MET  2. Pt will tolerate dental soft trials x5 for possible diet upgrade with no overt s/s of aspiration.  Discontinued               Plan:   Patient to be seen Therapy Frequency: 3 x/week   Plan of Care expires: 04/13/17  Plan of Care reviewed with: patient  SLP Follow-up?: No  SLP - Next Visit Date: 03/16/17      SLP G-Codes  Functional Assessment Tool Used: noms  Score: 5  Functional Limitations: Swallowing  Swallow Goal Status (): CI  Swallow Discharge Status (): [facility]  03/17/2017     How Severe Are Your Spot(S)?: mild Have Your Spot(S) Been Treated In The Past?: has not been treated Hpi Title: Evaluation of Skin Lesions